# Patient Record
Sex: FEMALE | Race: WHITE | Employment: UNEMPLOYED | URBAN - METROPOLITAN AREA
[De-identification: names, ages, dates, MRNs, and addresses within clinical notes are randomized per-mention and may not be internally consistent; named-entity substitution may affect disease eponyms.]

---

## 2020-10-06 ENCOUNTER — APPOINTMENT (OUTPATIENT)
Dept: GENERAL RADIOLOGY | Age: 35
DRG: 439 | End: 2020-10-06
Attending: INTERNAL MEDICINE
Payer: OTHER GOVERNMENT

## 2020-10-06 ENCOUNTER — APPOINTMENT (OUTPATIENT)
Dept: CT IMAGING | Age: 35
DRG: 439 | End: 2020-10-06
Attending: EMERGENCY MEDICINE
Payer: OTHER GOVERNMENT

## 2020-10-06 ENCOUNTER — HOSPITAL ENCOUNTER (INPATIENT)
Age: 35
LOS: 4 days | Discharge: LEFT BEFORE DISCHARGE | DRG: 439 | End: 2020-10-10
Attending: EMERGENCY MEDICINE | Admitting: INTERNAL MEDICINE
Payer: OTHER GOVERNMENT

## 2020-10-06 ENCOUNTER — APPOINTMENT (OUTPATIENT)
Dept: ULTRASOUND IMAGING | Age: 35
DRG: 439 | End: 2020-10-06
Attending: EMERGENCY MEDICINE
Payer: OTHER GOVERNMENT

## 2020-10-06 DIAGNOSIS — K85.20 ALCOHOL-INDUCED ACUTE PANCREATITIS, UNSPECIFIED COMPLICATION STATUS: Primary | ICD-10-CM

## 2020-10-06 DIAGNOSIS — E87.6 HYPOKALEMIA: ICD-10-CM

## 2020-10-06 DIAGNOSIS — D69.6 THROMBOCYTOPENIA (HCC): ICD-10-CM

## 2020-10-06 DIAGNOSIS — D64.9 ANEMIA, UNSPECIFIED TYPE: ICD-10-CM

## 2020-10-06 PROBLEM — K85.90 ACUTE PANCREATITIS: Status: ACTIVE | Noted: 2020-10-06

## 2020-10-06 LAB
ALBUMIN SERPL-MCNC: 1.8 G/DL (ref 3.4–5)
ALBUMIN/GLOB SERPL: 0.2 {RATIO} (ref 0.8–1.7)
ALP SERPL-CCNC: 332 U/L (ref 45–117)
ALT SERPL-CCNC: 36 U/L (ref 13–56)
AMMONIA PLAS-SCNC: <10 UMOL/L (ref 11–32)
ANION GAP SERPL CALC-SCNC: 13 MMOL/L (ref 3–18)
AST SERPL-CCNC: 212 U/L (ref 10–38)
BASOPHILS # BLD: 0 K/UL (ref 0–0.1)
BASOPHILS NFR BLD: 0 % (ref 0–2)
BILIRUB SERPL-MCNC: 14.9 MG/DL (ref 0.2–1)
BUN SERPL-MCNC: 3 MG/DL (ref 7–18)
BUN/CREAT SERPL: 4 (ref 12–20)
CALCIUM SERPL-MCNC: 7.9 MG/DL (ref 8.5–10.1)
CHLORIDE SERPL-SCNC: 97 MMOL/L (ref 100–111)
CO2 SERPL-SCNC: 24 MMOL/L (ref 21–32)
CREAT SERPL-MCNC: 0.81 MG/DL (ref 0.6–1.3)
DIFFERENTIAL METHOD BLD: ABNORMAL
EOSINOPHIL # BLD: 0 K/UL (ref 0–0.4)
EOSINOPHIL NFR BLD: 0 % (ref 0–5)
ERYTHROCYTE [DISTWIDTH] IN BLOOD BY AUTOMATED COUNT: 19.8 % (ref 11.6–14.5)
GLOBULIN SER CALC-MCNC: 8.6 G/DL (ref 2–4)
GLUCOSE SERPL-MCNC: 102 MG/DL (ref 74–99)
HCG SERPL QL: NEGATIVE
HCT VFR BLD AUTO: 24.2 % (ref 35–45)
HGB BLD-MCNC: 8.7 G/DL (ref 12–16)
INR PPP: 2 (ref 0.8–1.2)
LIPASE SERPL-CCNC: 895 U/L (ref 73–393)
LYMPHOCYTES # BLD: 0.6 K/UL (ref 0.9–3.6)
LYMPHOCYTES NFR BLD: 6 % (ref 21–52)
MCH RBC QN AUTO: 34.5 PG (ref 24–34)
MCHC RBC AUTO-ENTMCNC: 36 G/DL (ref 31–37)
MCV RBC AUTO: 96 FL (ref 74–97)
MONOCYTES # BLD: 0.8 K/UL (ref 0.05–1.2)
MONOCYTES NFR BLD: 8 % (ref 3–10)
NEUTS SEG # BLD: 8.5 K/UL (ref 1.8–8)
NEUTS SEG NFR BLD: 86 % (ref 40–73)
PLATELET # BLD AUTO: 65 K/UL (ref 135–420)
POTASSIUM SERPL-SCNC: 2.8 MMOL/L (ref 3.5–5.5)
PROT SERPL-MCNC: 10.4 G/DL (ref 6.4–8.2)
PROTHROMBIN TIME: 22.5 SEC (ref 11.5–15.2)
RBC # BLD AUTO: 2.52 M/UL (ref 4.2–5.3)
SODIUM SERPL-SCNC: 134 MMOL/L (ref 136–145)
WBC # BLD AUTO: 10 K/UL (ref 4.6–13.2)

## 2020-10-06 PROCEDURE — 82140 ASSAY OF AMMONIA: CPT

## 2020-10-06 PROCEDURE — 74011000636 HC RX REV CODE- 636: Performed by: EMERGENCY MEDICINE

## 2020-10-06 PROCEDURE — 84703 CHORIONIC GONADOTROPIN ASSAY: CPT

## 2020-10-06 PROCEDURE — 96374 THER/PROPH/DIAG INJ IV PUSH: CPT

## 2020-10-06 PROCEDURE — 74011250637 HC RX REV CODE- 250/637: Performed by: EMERGENCY MEDICINE

## 2020-10-06 PROCEDURE — 74011250636 HC RX REV CODE- 250/636: Performed by: EMERGENCY MEDICINE

## 2020-10-06 PROCEDURE — 83690 ASSAY OF LIPASE: CPT

## 2020-10-06 PROCEDURE — 96375 TX/PRO/DX INJ NEW DRUG ADDON: CPT

## 2020-10-06 PROCEDURE — 80307 DRUG TEST PRSMV CHEM ANLYZR: CPT

## 2020-10-06 PROCEDURE — 99285 EMERGENCY DEPT VISIT HI MDM: CPT

## 2020-10-06 PROCEDURE — 74177 CT ABD & PELVIS W/CONTRAST: CPT

## 2020-10-06 PROCEDURE — 85025 COMPLETE CBC W/AUTO DIFF WBC: CPT

## 2020-10-06 PROCEDURE — 65270000029 HC RM PRIVATE

## 2020-10-06 PROCEDURE — 85610 PROTHROMBIN TIME: CPT

## 2020-10-06 PROCEDURE — 80053 COMPREHEN METABOLIC PANEL: CPT

## 2020-10-06 PROCEDURE — 76705 ECHO EXAM OF ABDOMEN: CPT

## 2020-10-06 PROCEDURE — 81001 URINALYSIS AUTO W/SCOPE: CPT

## 2020-10-06 PROCEDURE — 93005 ELECTROCARDIOGRAM TRACING: CPT

## 2020-10-06 PROCEDURE — 71045 X-RAY EXAM CHEST 1 VIEW: CPT

## 2020-10-06 RX ORDER — HYDROMORPHONE HYDROCHLORIDE 1 MG/ML
1 INJECTION, SOLUTION INTRAMUSCULAR; INTRAVENOUS; SUBCUTANEOUS
Status: COMPLETED | OUTPATIENT
Start: 2020-10-06 | End: 2020-10-06

## 2020-10-06 RX ORDER — ONDANSETRON 2 MG/ML
8 INJECTION INTRAMUSCULAR; INTRAVENOUS
Status: COMPLETED | OUTPATIENT
Start: 2020-10-06 | End: 2020-10-06

## 2020-10-06 RX ORDER — POTASSIUM CHLORIDE 7.45 MG/ML
10 INJECTION INTRAVENOUS
Status: DISCONTINUED | OUTPATIENT
Start: 2020-10-06 | End: 2020-10-06

## 2020-10-06 RX ORDER — CITALOPRAM 20 MG/1
TABLET, FILM COATED ORAL DAILY
COMMUNITY

## 2020-10-06 RX ORDER — MELOXICAM 7.5 MG/1
TABLET ORAL DAILY
COMMUNITY

## 2020-10-06 RX ADMIN — SODIUM CHLORIDE, SODIUM LACTATE, POTASSIUM CHLORIDE, AND CALCIUM CHLORIDE 1000 ML: 600; 310; 30; 20 INJECTION, SOLUTION INTRAVENOUS at 20:08

## 2020-10-06 RX ADMIN — HYDROMORPHONE HYDROCHLORIDE 1 MG: 1 INJECTION, SOLUTION INTRAMUSCULAR; INTRAVENOUS; SUBCUTANEOUS at 20:09

## 2020-10-06 RX ADMIN — IOPAMIDOL 94 ML: 612 INJECTION, SOLUTION INTRAVENOUS at 21:12

## 2020-10-06 RX ADMIN — POTASSIUM BICARBONATE 40 MEQ: 782 TABLET, EFFERVESCENT ORAL at 22:34

## 2020-10-06 RX ADMIN — ONDANSETRON 8 MG: 2 INJECTION INTRAMUSCULAR; INTRAVENOUS at 20:09

## 2020-10-07 ENCOUNTER — APPOINTMENT (OUTPATIENT)
Dept: MRI IMAGING | Age: 35
DRG: 439 | End: 2020-10-07
Attending: INTERNAL MEDICINE
Payer: OTHER GOVERNMENT

## 2020-10-07 PROBLEM — R17 JAUNDICE: Status: ACTIVE | Noted: 2020-10-07

## 2020-10-07 LAB
ALBUMIN SERPL-MCNC: 1.5 G/DL (ref 3.4–5)
ALBUMIN/GLOB SERPL: 0.2 {RATIO} (ref 0.8–1.7)
ALP SERPL-CCNC: 254 U/L (ref 45–117)
ALT SERPL-CCNC: 30 U/L (ref 13–56)
ANION GAP SERPL CALC-SCNC: 8 MMOL/L (ref 3–18)
APPEARANCE UR: ABNORMAL
AST SERPL-CCNC: 165 U/L (ref 10–38)
ATRIAL RATE: 100 BPM
BACTERIA URNS QL MICRO: ABNORMAL /HPF
BASOPHILS # BLD: 0 K/UL (ref 0–0.1)
BASOPHILS NFR BLD: 0 % (ref 0–2)
BILIRUB SERPL-MCNC: 13.2 MG/DL (ref 0.2–1)
BILIRUB UR QL: ABNORMAL
BUN SERPL-MCNC: 3 MG/DL (ref 7–18)
BUN/CREAT SERPL: 5 (ref 12–20)
CALCIUM SERPL-MCNC: 7.6 MG/DL (ref 8.5–10.1)
CALCULATED P AXIS, ECG09: 58 DEGREES
CALCULATED R AXIS, ECG10: 11 DEGREES
CALCULATED T AXIS, ECG11: 39 DEGREES
CHLORIDE SERPL-SCNC: 100 MMOL/L (ref 100–111)
CO2 SERPL-SCNC: 28 MMOL/L (ref 21–32)
COLOR UR: ABNORMAL
COVID-19 RAPID TEST, COVR: NOT DETECTED
CREAT SERPL-MCNC: 0.61 MG/DL (ref 0.6–1.3)
DIAGNOSIS, 93000: NORMAL
DIFFERENTIAL METHOD BLD: ABNORMAL
EOSINOPHIL # BLD: 0 K/UL (ref 0–0.4)
EOSINOPHIL NFR BLD: 0 % (ref 0–5)
EPITH CASTS URNS QL MICRO: ABNORMAL /LPF (ref 0–5)
ERYTHROCYTE [DISTWIDTH] IN BLOOD BY AUTOMATED COUNT: 20.1 % (ref 11.6–14.5)
ETHANOL SERPL-MCNC: 130 MG/DL (ref 0–3)
GLOBULIN SER CALC-MCNC: 7 G/DL (ref 2–4)
GLUCOSE SERPL-MCNC: 68 MG/DL (ref 74–99)
GLUCOSE UR STRIP.AUTO-MCNC: NEGATIVE MG/DL
HCT VFR BLD AUTO: 20.4 % (ref 35–45)
HEALTH STATUS, XMCV2T: NORMAL
HGB BLD-MCNC: 7.2 G/DL (ref 12–16)
HGB UR QL STRIP: NEGATIVE
KETONES UR QL STRIP.AUTO: NEGATIVE MG/DL
LEUKOCYTE ESTERASE UR QL STRIP.AUTO: ABNORMAL
LYMPHOCYTES # BLD: 0.9 K/UL (ref 0.9–3.6)
LYMPHOCYTES NFR BLD: 11 % (ref 21–52)
MCH RBC QN AUTO: 33.6 PG (ref 24–34)
MCHC RBC AUTO-ENTMCNC: 35.3 G/DL (ref 31–37)
MCV RBC AUTO: 95.3 FL (ref 74–97)
MONOCYTES # BLD: 0.7 K/UL (ref 0.05–1.2)
MONOCYTES NFR BLD: 9 % (ref 3–10)
NEUTS SEG # BLD: 6.6 K/UL (ref 1.8–8)
NEUTS SEG NFR BLD: 80 % (ref 40–73)
NITRITE UR QL STRIP.AUTO: NEGATIVE
P-R INTERVAL, ECG05: 158 MS
PH UR STRIP: 6.5 [PH] (ref 5–8)
PLATELET # BLD AUTO: 48 K/UL (ref 135–420)
POTASSIUM SERPL-SCNC: 3.2 MMOL/L (ref 3.5–5.5)
PROT SERPL-MCNC: 8.5 G/DL (ref 6.4–8.2)
PROT UR STRIP-MCNC: ABNORMAL MG/DL
Q-T INTERVAL, ECG07: 374 MS
QRS DURATION, ECG06: 98 MS
QTC CALCULATION (BEZET), ECG08: 482 MS
RBC # BLD AUTO: 2.14 M/UL (ref 4.2–5.3)
RBC #/AREA URNS HPF: ABNORMAL /HPF (ref 0–5)
RBC MORPH BLD: ABNORMAL
SODIUM SERPL-SCNC: 136 MMOL/L (ref 136–145)
SOURCE, COVRS: NORMAL
SP GR UR REFRACTOMETRY: >1.03 (ref 1–1.03)
SPECIMEN TYPE, XMCV1T: NORMAL
UROBILINOGEN UR QL STRIP.AUTO: 1 EU/DL (ref 0.2–1)
VENTRICULAR RATE, ECG03: 100 BPM
WBC # BLD AUTO: 8.2 K/UL (ref 4.6–13.2)
WBC URNS QL MICRO: ABNORMAL /HPF (ref 0–4)

## 2020-10-07 PROCEDURE — 80053 COMPREHEN METABOLIC PANEL: CPT

## 2020-10-07 PROCEDURE — 2709999900 HC NON-CHARGEABLE SUPPLY

## 2020-10-07 PROCEDURE — 74183 MRI ABD W/O CNTR FLWD CNTR: CPT

## 2020-10-07 PROCEDURE — 74011250636 HC RX REV CODE- 250/636: Performed by: INTERNAL MEDICINE

## 2020-10-07 PROCEDURE — 83036 HEMOGLOBIN GLYCOSYLATED A1C: CPT

## 2020-10-07 PROCEDURE — 74011250636 HC RX REV CODE- 250/636: Performed by: EMERGENCY MEDICINE

## 2020-10-07 PROCEDURE — 87635 SARS-COV-2 COVID-19 AMP PRB: CPT

## 2020-10-07 PROCEDURE — 36415 COLL VENOUS BLD VENIPUNCTURE: CPT

## 2020-10-07 PROCEDURE — 74011636320 HC RX REV CODE- 636/320: Performed by: HOSPITALIST

## 2020-10-07 PROCEDURE — A9575 INJ GADOTERATE MEGLUMI 0.1ML: HCPCS | Performed by: HOSPITALIST

## 2020-10-07 PROCEDURE — 65270000029 HC RM PRIVATE

## 2020-10-07 PROCEDURE — 74011000258 HC RX REV CODE- 258: Performed by: INTERNAL MEDICINE

## 2020-10-07 PROCEDURE — 85025 COMPLETE CBC W/AUTO DIFF WBC: CPT

## 2020-10-07 PROCEDURE — 74011250637 HC RX REV CODE- 250/637: Performed by: INTERNAL MEDICINE

## 2020-10-07 PROCEDURE — 74011000258 HC RX REV CODE- 258: Performed by: EMERGENCY MEDICINE

## 2020-10-07 RX ORDER — LANOLIN ALCOHOL/MO/W.PET/CERES
12 CREAM (GRAM) TOPICAL
Status: DISCONTINUED | OUTPATIENT
Start: 2020-10-07 | End: 2020-10-10 | Stop reason: HOSPADM

## 2020-10-07 RX ORDER — LORAZEPAM 2 MG/ML
3 INJECTION INTRAMUSCULAR
Status: DISCONTINUED | OUTPATIENT
Start: 2020-10-07 | End: 2020-10-10 | Stop reason: HOSPADM

## 2020-10-07 RX ORDER — NALOXONE HYDROCHLORIDE 0.4 MG/ML
0.4 INJECTION, SOLUTION INTRAMUSCULAR; INTRAVENOUS; SUBCUTANEOUS AS NEEDED
Status: DISCONTINUED | OUTPATIENT
Start: 2020-10-07 | End: 2020-10-10 | Stop reason: HOSPADM

## 2020-10-07 RX ORDER — CITALOPRAM 20 MG/1
20 TABLET, FILM COATED ORAL DAILY
Status: DISCONTINUED | OUTPATIENT
Start: 2020-10-07 | End: 2020-10-10 | Stop reason: HOSPADM

## 2020-10-07 RX ORDER — PROCHLORPERAZINE EDISYLATE 5 MG/ML
5 INJECTION INTRAMUSCULAR; INTRAVENOUS
Status: DISCONTINUED | OUTPATIENT
Start: 2020-10-07 | End: 2020-10-10 | Stop reason: HOSPADM

## 2020-10-07 RX ORDER — LORAZEPAM 2 MG/ML
2 INJECTION INTRAMUSCULAR
Status: DISCONTINUED | OUTPATIENT
Start: 2020-10-07 | End: 2020-10-10 | Stop reason: HOSPADM

## 2020-10-07 RX ORDER — SODIUM CHLORIDE 0.9 % (FLUSH) 0.9 %
5-40 SYRINGE (ML) INJECTION AS NEEDED
Status: DISCONTINUED | OUTPATIENT
Start: 2020-10-07 | End: 2020-10-10 | Stop reason: HOSPADM

## 2020-10-07 RX ORDER — DOCUSATE SODIUM 100 MG/1
100 CAPSULE, LIQUID FILLED ORAL
Status: DISCONTINUED | OUTPATIENT
Start: 2020-10-07 | End: 2020-10-10 | Stop reason: HOSPADM

## 2020-10-07 RX ORDER — MORPHINE SULFATE 2 MG/ML
2-4 INJECTION, SOLUTION INTRAMUSCULAR; INTRAVENOUS
Status: DISCONTINUED | OUTPATIENT
Start: 2020-10-07 | End: 2020-10-09

## 2020-10-07 RX ORDER — LORAZEPAM 1 MG/1
1 TABLET ORAL
Status: DISCONTINUED | OUTPATIENT
Start: 2020-10-07 | End: 2020-10-10 | Stop reason: HOSPADM

## 2020-10-07 RX ORDER — LORAZEPAM 1 MG/1
2 TABLET ORAL
Status: DISCONTINUED | OUTPATIENT
Start: 2020-10-07 | End: 2020-10-10 | Stop reason: HOSPADM

## 2020-10-07 RX ORDER — ONDANSETRON 2 MG/ML
8 INJECTION INTRAMUSCULAR; INTRAVENOUS
Status: COMPLETED | OUTPATIENT
Start: 2020-10-07 | End: 2020-10-07

## 2020-10-07 RX ORDER — PANTOPRAZOLE SODIUM 40 MG/10ML
40 INJECTION, POWDER, LYOPHILIZED, FOR SOLUTION INTRAVENOUS DAILY PRN
Status: DISCONTINUED | OUTPATIENT
Start: 2020-10-07 | End: 2020-10-10 | Stop reason: HOSPADM

## 2020-10-07 RX ORDER — SODIUM CHLORIDE 9 MG/ML
100 INJECTION, SOLUTION INTRAVENOUS CONTINUOUS
Status: DISCONTINUED | OUTPATIENT
Start: 2020-10-07 | End: 2020-10-08

## 2020-10-07 RX ORDER — ONDANSETRON 2 MG/ML
4 INJECTION INTRAMUSCULAR; INTRAVENOUS
Status: DISCONTINUED | OUTPATIENT
Start: 2020-10-07 | End: 2020-10-10 | Stop reason: HOSPADM

## 2020-10-07 RX ORDER — SODIUM CHLORIDE, SODIUM LACTATE, POTASSIUM CHLORIDE, CALCIUM CHLORIDE 600; 310; 30; 20 MG/100ML; MG/100ML; MG/100ML; MG/100ML
150 INJECTION, SOLUTION INTRAVENOUS CONTINUOUS
Status: DISCONTINUED | OUTPATIENT
Start: 2020-10-07 | End: 2020-10-07

## 2020-10-07 RX ORDER — HYDROMORPHONE HYDROCHLORIDE 1 MG/ML
1 INJECTION, SOLUTION INTRAMUSCULAR; INTRAVENOUS; SUBCUTANEOUS ONCE
Status: COMPLETED | OUTPATIENT
Start: 2020-10-07 | End: 2020-10-07

## 2020-10-07 RX ORDER — SODIUM CHLORIDE 0.9 % (FLUSH) 0.9 %
5-40 SYRINGE (ML) INJECTION EVERY 8 HOURS
Status: DISCONTINUED | OUTPATIENT
Start: 2020-10-07 | End: 2020-10-10 | Stop reason: HOSPADM

## 2020-10-07 RX ORDER — IPRATROPIUM BROMIDE AND ALBUTEROL SULFATE 2.5; .5 MG/3ML; MG/3ML
3 SOLUTION RESPIRATORY (INHALATION)
Status: DISCONTINUED | OUTPATIENT
Start: 2020-10-07 | End: 2020-10-10 | Stop reason: HOSPADM

## 2020-10-07 RX ORDER — LORAZEPAM 2 MG/ML
1 INJECTION INTRAMUSCULAR
Status: DISCONTINUED | OUTPATIENT
Start: 2020-10-07 | End: 2020-10-10 | Stop reason: HOSPADM

## 2020-10-07 RX ADMIN — MORPHINE SULFATE 4 MG: 2 INJECTION, SOLUTION INTRAMUSCULAR; INTRAVENOUS at 08:35

## 2020-10-07 RX ADMIN — SODIUM CHLORIDE 100 ML/HR: 900 INJECTION, SOLUTION INTRAVENOUS at 12:30

## 2020-10-07 RX ADMIN — MORPHINE SULFATE 4 MG: 2 INJECTION, SOLUTION INTRAMUSCULAR; INTRAVENOUS at 14:29

## 2020-10-07 RX ADMIN — GADOTERATE MEGLUMINE 15 ML: 376.9 INJECTION INTRAVENOUS at 13:35

## 2020-10-07 RX ADMIN — MORPHINE SULFATE 2 MG: 2 INJECTION, SOLUTION INTRAMUSCULAR; INTRAVENOUS at 21:06

## 2020-10-07 RX ADMIN — PIPERACILLIN AND TAZOBACTAM 3.38 G: 3; .375 INJECTION, POWDER, LYOPHILIZED, FOR SOLUTION INTRAVENOUS at 00:33

## 2020-10-07 RX ADMIN — ONDANSETRON 8 MG: 2 INJECTION INTRAMUSCULAR; INTRAVENOUS at 01:45

## 2020-10-07 RX ADMIN — PIPERACILLIN AND TAZOBACTAM 3.38 G: 3; .375 INJECTION, POWDER, LYOPHILIZED, FOR SOLUTION INTRAVENOUS at 21:00

## 2020-10-07 RX ADMIN — Medication 10 ML: at 22:09

## 2020-10-07 RX ADMIN — Medication 10 ML: at 06:09

## 2020-10-07 RX ADMIN — CITALOPRAM HYDROBROMIDE 20 MG: 20 TABLET ORAL at 08:35

## 2020-10-07 RX ADMIN — PIPERACILLIN AND TAZOBACTAM 3.38 G: 3; .375 INJECTION, POWDER, LYOPHILIZED, FOR SOLUTION INTRAVENOUS at 14:26

## 2020-10-07 RX ADMIN — PROCHLORPERAZINE EDISYLATE 5 MG: 5 INJECTION INTRAMUSCULAR; INTRAVENOUS at 12:30

## 2020-10-07 RX ADMIN — ONDANSETRON 4 MG: 2 INJECTION INTRAMUSCULAR; INTRAVENOUS at 08:35

## 2020-10-07 RX ADMIN — HYDROMORPHONE HYDROCHLORIDE 1 MG: 1 INJECTION, SOLUTION INTRAMUSCULAR; INTRAVENOUS; SUBCUTANEOUS at 01:45

## 2020-10-07 NOTE — PROGRESS NOTES
Problem: Discharge Planning  Goal: *Discharge to safe environment  Outcome: Resolved/Met      Home    Reason for Admission:   Acute Pancreatitis / Jaundice                   RUR Score: 10                    Plan for utilizing home health:  Not indicated        PCP:First and Last name:  Dane Robles   Name of Practice:    Are you a current patient: Yes/No:  Yes   Approximate date of last visit: Last month   Can you participate in a virtual visit with your PCP: Yes                    Current Advanced Directive/Advance Care Plan:  None, does not want to complete one @ this time. Transition of Care Plan:  Home with out-pt follow up when medically stable. Chart reviewed. Met with pt., verified all demographics. States has  ins. NOK: Sam Monsivais, spouse, with whom she lives with & will pick her up @ discharge. Uses no DME. Independent with ADL's prior to admit. Will cont to follow for any needs. Keesha RomeoRN,ext [id number]. Patient has designated her spouse to participate in his/her discharge plan and to receive any needed information. Name: Sam Monsivais  Address:  Phone number:  [phone]    Care Management Interventions  PCP Verified by CM: Yes  Palliative Care Criteria Met (RRAT>21 & CHF Dx)?: No  Mode of Transport at Discharge:  Other (see comment)(family)  Transition of Care Consult (CM Consult): Discharge Planning  Discharge Durable Medical Equipment: No  Physical Therapy Consult: No  Occupational Therapy Consult: No  Speech Therapy Consult: No  Current Support Network: Lives with Spouse  Confirm Follow Up Transport: Self  Discharge Location  Discharge Placement: Home

## 2020-10-07 NOTE — ED NOTES
TRANSFER - ED to INPATIENT REPORT:    Verbal report given on Sandie Lomeli  being transferred to 2400(unit) for routine progression of care       Report consisted of patients Situation, Background, Assessment and   Recommendations(SBAR). SBAR report made available to receiving floor on this patient being transferred to 2400  for routine progression of care       Admitting diagnosis Acute pancreatitis [K85.90]  Thrombocytopenia ([address] [D69.6]  Hypokalemia [E87.6]    Information from the following report(s) SBAR, ED Summary, MAR and Recent Results was made available to receiving floor. Lines:   Peripheral IV 10/06/20 Right Antecubital (Active)        HCG Status for ALL Females under 55 y/o: NO     Medication list confirmed with patient    Opportunity for questions and clarification was provided. Patient is oriented to time, place, person and situation .   Patient is  continent and ambulatory without assist     Valuables transported with patient     Patient transported with:   Monitor  Registered Nurse    MAP (Monitor): 85 =Monitored (most recent)  Vitals w/ MEWS Score (last day)     Date/Time MEWS Score Pulse Resp Temp BP Level of Consciousness SpO2    10/07/20 0015          114/76    99 %    10/07/20 0001          115/75    97 %    10/06/20 2315          120/77    97 %    10/06/20 2308              100 %    10/06/20 2307          127/79        10/06/20 2245          117/74    96 %    10/06/20 2230          117/73    97 %    10/06/20 2215          117/73    97 %    10/06/20 2200          120/78    98 %    10/06/20 2145          118/75    98 %    10/06/20 2015          120/79    98 %    10/06/20 2013              100 %    10/06/20 2006              100 %    10/06/20 1714  2  (!) 102  16  97.9 °F (36.6 °C)  129/83  Alert  100 %

## 2020-10-07 NOTE — ROUTINE PROCESS
0021 TRANSFER - IN REPORT: 
 
Verbal report received from [address] [place] [address] Street, RN(name) on Olinda Wiley  being received from ED(unit) for routine progression of care Report consisted of patients Situation, Background, Assessment and  
Recommendations(SBAR). Information from the following report(s) SBAR, ED Summary, [facility] and Recent Results was reviewed with the receiving nurse. Opportunity for questions and clarification was provided. Assessment completed upon patients arrival to unit and care assumed. 0210 pt ambulated from stretcher to bed. Pt is alert, no distress noted. Call light is within reach, bedside table in reach. Bed in low position with wheels locked. IV in place, no fluids running. Skin intact. Pt experiencing nausea after ambulating, no emesis. 0214 VS taken, pain assessed. 0240 ambulated with pt to bathroom to void. Gait steady. Decreased nausea at this time. 2670 VS taken, pain assessed. No complaints of nausea at this time. 0740 Bedside and Verbal shift change report given to Kacie Rosas RN (oncoming nurse) by Mendy Medina RN (offgoing nurse). Report included the following information SBAR, Kardex, Intake/Output, and MAR.

## 2020-10-07 NOTE — PROGRESS NOTES
Patient received in bed awake. Patient A&Ox4, denies pain and discomfort. No distress noted. Frequently use items within reach. Bed locked in low position. Call bell within reach and Patient verbalized understanding of use for assistance and needs. 0830-  Patient c/o nausea; see MAR for prn Compazine 5mg IV to be given. Call bell w/in reach. 6629- Patient given Morphine 4 mg IV for abd pain 8/10. See MAR and pain assessments. 0900- Patient awake; denies having nausea. Call bell w/in reach. 1429- Patient given Morphine 4 mg IV for abd pain 8/10. See MAR and pain assessments. 1830- Patient awake. Reassessment completed, no change in patient condition. Call bell w/in reach.

## 2020-10-07 NOTE — ED PROVIDER NOTES
EMERGENCY DEPARTMENT HISTORY AND PHYSICAL EXAM      Date: 10/6/2020  Patient Name: Tita Rosen    History of Presenting Illness     Chief Complaint   Patient presents with    Abdominal Pain    Vomiting       History (Context): Tita Rosen is a 38 y.o. woman with alcoholic liver disease history of pancreatitis, who has been jaundiced for the past several months, who presents with acute onset, persistent, severe epigastric abdominal pain associated with nausea and vomiting. The pain radiates to the back. Exacerbated with eating. On review of systems, the patient denies fever, chills, diarrhea, back pain, chest pain, shortness of breath, diaphoresis, rashes, tick bite, recent travel. PCP: ZOEY Lee    Current Facility-Administered Medications   Medication Dose Route Frequency Provider Last Rate Last Dose    piperacillin-tazobactam (ZOSYN) 3.375 g in 0.9% sodium chloride (MBP/ADV) 100 mL MBP  3.375 g IntraVENous NOW Saurabh Frye MD         Current Outpatient Medications   Medication Sig Dispense Refill    citalopram (CELEXA) 20 mg tablet Take  by mouth daily.  meloxicam (MOBIC) 7.5 mg tablet Take  by mouth daily. Past History     Past Medical History:  Past Medical History:   Diagnosis Date    Arthritis     Asthma        Past Surgical History:  Past Surgical History:   Procedure Laterality Date    HX ORTHOPAEDIC      right hip       Family History:  History reviewed. No pertinent family history. Social History:  Social History     Tobacco Use    Smoking status: Current Every Day Smoker    Smokeless tobacco: Never Used   Substance Use Topics    Alcohol use: Yes     Comment: yesterday last drink    Drug use: Not on file       Allergies: Allergies   Allergen Reactions    Shellfish Derived Hives         Review of Systems   As per HPI, otherwise reviewed and negative.      Physical Exam     Vitals:    10/06/20 2308 10/06/20 2315 10/07/20 0001 10/07/20 0015   BP: 120/77 115/75 114/76   Pulse:       Resp:       Temp:       SpO2: 100% 97% 97% 99%   Weight:       Height:           Gen: In clear pain, chronically ill-appearing  HEENT: Normocephalic, sclera icteric, sublingual icterus  Cardiovascular: Normal rate, regular rhythm, no murmurs, rubs, gallops. Pulses intact and equal distally. Pulmonary: No respiratory distress. No stridor. Clear lungs. ABD: Soft, tender in the epigastrium, positive Robles sign, nondistended. Neuro: Alert. Normal speech. Normal mentation. Psych: Normal thought content and thought processes. : No CVA tenderness  EXT: Moves all extremities well. No cyanosis or clubbing. Skin: Icteric. Warm and well-perfused. Diagnostic Study Results     Labs -     Recent Results (from the past 12 hour(s))   CBC WITH AUTOMATED DIFF    Collection Time: 10/06/20  6:07 PM   Result Value Ref Range    WBC 10.0 4.6 - 13.2 K/uL    RBC 2.52 (L) 4.20 - 5.30 M/uL    HGB 8.7 (L) 12.0 - 16.0 g/dL    HCT 24.2 (L) 35.0 - 45.0 %    MCV 96.0 74.0 - 97.0 FL    MCH 34.5 (H) 24.0 - 34.0 PG    MCHC 36.0 31.0 - 37.0 g/dL    RDW 19.8 (H) 11.6 - 14.5 %    PLATELET 65 (L) 594 - 420 K/uL    NEUTROPHILS 86 (H) 40 - 73 %    LYMPHOCYTES 6 (L) 21 - 52 %    MONOCYTES 8 3 - 10 %    EOSINOPHILS 0 0 - 5 %    BASOPHILS 0 0 - 2 %    ABS. NEUTROPHILS 8.5 (H) 1.8 - 8.0 K/UL    ABS. LYMPHOCYTES 0.6 (L) 0.9 - 3.6 K/UL    ABS. MONOCYTES 0.8 0.05 - 1.2 K/UL    ABS. EOSINOPHILS 0.0 0.0 - 0.4 K/UL    ABS.  BASOPHILS 0.0 0.0 - 0.1 K/UL    DF AUTOMATED     METABOLIC PANEL, COMPREHENSIVE    Collection Time: 10/06/20  6:07 PM   Result Value Ref Range    Sodium 134 (L) 136 - 145 mmol/L    Potassium 2.8 (LL) 3.5 - 5.5 mmol/L    Chloride 97 (L) 100 - 111 mmol/L    CO2 24 21 - 32 mmol/L    Anion gap 13 3.0 - 18 mmol/L    Glucose 102 (H) 74 - 99 mg/dL    BUN 3 (L) 7.0 - 18 MG/DL    Creatinine 0.81 0.6 - 1.3 MG/DL    BUN/Creatinine ratio 4 (L) 12 - 20      GFR est AA >60 >60 ml/min/1.73m2    GFR est non-AA >60 >60 ml/min/1.73m2    Calcium 7.9 (L) 8.5 - 10.1 MG/DL    Bilirubin, total 14.9 (H) 0.2 - 1.0 MG/DL    ALT (SGPT) 36 13 - 56 U/L    AST (SGOT) 212 (H) 10 - 38 U/L    Alk. phosphatase 332 (H) 45 - 117 U/L    Protein, total 10.4 (H) 6.4 - 8.2 g/dL    Albumin 1.8 (L) 3.4 - 5.0 g/dL    Globulin 8.6 (H) 2.0 - 4.0 g/dL    A-G Ratio 0.2 (L) 0.8 - 1.7     LIPASE    Collection Time: 10/06/20  6:07 PM   Result Value Ref Range    Lipase 895 (H) 73 - 393 U/L   HCG QL SERUM    Collection Time: 10/06/20  6:07 PM   Result Value Ref Range    HCG, Ql. Negative NEG     PROTHROMBIN TIME + INR    Collection Time: 10/06/20  6:07 PM   Result Value Ref Range    Prothrombin time 22.5 (H) 11.5 - 15.2 sec    INR 2.0 (H) 0.8 - 1.2     ETHYL ALCOHOL    Collection Time: 10/06/20  6:07 PM   Result Value Ref Range    ALCOHOL(ETHYL),SERUM 130 (H) 0 - 3 MG/DL   AMMONIA    Collection Time: 10/06/20  8:10 PM   Result Value Ref Range    Ammonia <10 (L) 11 - 32 UMOL/L   EKG, 12 LEAD, INITIAL    Collection Time: 10/06/20  9:43 PM   Result Value Ref Range    Ventricular Rate 100 BPM    Atrial Rate 100 BPM    P-R Interval 158 ms    QRS Duration 98 ms    Q-T Interval 374 ms    QTC Calculation (Bezet) 482 ms    Calculated P Axis 58 degrees    Calculated R Axis 11 degrees    Calculated T Axis 39 degrees    Diagnosis       Normal sinus rhythm  Possible Left atrial enlargement  Cannot rule out Anterior infarct , age undetermined  Abnormal ECG  No previous ECGs available         Radiologic Studies -   CT ABD PELV W CONT    (Results Pending)   US ABD LTD    (Results Pending)   XR CHEST PORT    (Results Pending)     CT Results  (Last 48 hours)    None        CXR Results  (Last 48 hours)    None            Medical Decision Making   I am the first provider for this patient. I reviewed the vital signs, available nursing notes, past medical history, past surgical history, family history and social history.     Vital Signs-Reviewed the patient's vital signs.    EKG: Interpreted by myself. Records Reviewed: By myself personally on initial evaluation    MDM:   Patient presents with abdominal pain. Exam significant for tenderness in the epigastrium. DDX considered: Pancreatitis, gastritis, peptic ulcer disease, cholecystitis, choledocholithiasis, SBO, functional abdominal pain, acute intermittent porphyria, gastroparesis, gastroenteritis. DDX thought to be less likely but also considered due to high risk condition: Cholangitis, mesenteric ischemia. Plan:   Pain Control  Antiemetics  Close Observation    Orders as below:  Orders Placed This Encounter    CT ABD PELV W CONT    US ABD LTD    XR CHEST PORT    CBC WITH AUTOMATED DIFF    COMPREHENSIVE METABOLIC PANEL    LIPASE    AMMONIA    HCG QL SERUM    URINALYSIS W/ RFLX MICROSCOPIC    PROTHROMBIN TIME + INR    ETHYL ALCOHOL    IP CONSULT TO GASTROENTEROLOGY    EKG, 12 LEAD, INITIAL    citalopram (CELEXA) 20 mg tablet    meloxicam (MOBIC) 7.5 mg tablet    ondansetron (ZOFRAN) injection 8 mg    lactated ringers bolus infusion 1,000 mL    HYDROmorphone (DILAUDID) injection 1 mg    potassium bicarb-citric acid (EFFER-K) tablet 40 mEq    iopamidoL (ISOVUE 300) 61 % contrast injection 100 mL    piperacillin-tazobactam (ZOSYN) 3.375 g in 0.9% sodium chloride (MBP/ADV) 100 mL MBP    DISCONTD: potassium chloride 10 mEq in 100 ml IVPB    IP CONSULT TO HOSPITALIST    INITIAL PHYSICIAN ORDER: INPATIENT Medical; 3. Patient receiving treatment that can only be provided in an inpatient setting (further clarification in H&P documentation)        ED Course:   ED Course as of Oct 07 0024   Tue Oct 06, 2020   1949 Will replete   Potassium(!!): 2.8 [DT]   0878 Spoke to the hospitalist.  He recommends I consult GI for formation to admit, given cystic lesions in the pancreas. [DT]   8544 Spoke to gastroenterology, and they agree with admission here for the patient.     [DT]      ED Course User Index  [DT] Geovanny Zamorano MD     Ultrasound demonstrated findings of acalculous cholecystitis, likely secondary to ascites and pancreatitis. This determination was made in speaking with the radiologist.    Disposition:  Admit    Critical Care Time:       Diagnosis     Clinical Impression:   1. Alcohol-induced acute pancreatitis, unspecified complication status    2. Hypokalemia    3. Thrombocytopenia ([address]    4. Anemia, unspecified type        Signed,  Rosita Watson MD  Emergency Physician  [facility]    As a voice dictation software was utilized to dictate this note, minor word transpositions can occur. I apologize for confusing wording and typographic errors. Please feel free to contact me for clarification.

## 2020-10-07 NOTE — ROUTINE PROCESS
Bedside and Verbal shift change report given to Emily Acevedo RN (oncoming nurse) by Nunu Tee RN, BSN (offgoing nurse). Report given with SBAR, Kardex, Intake/Output, MAR and Recent Results.

## 2020-10-07 NOTE — CONSULTS
Gastroenterology Consult    Patient: Robert Lindsay MRN: [id number]  SSN: [id number]    YOB: 1985  Age: 38 y.o. Sex: female        Assessment:   1. Acute alcohol induced pancreatitis superimposed on chronic pancreatitis with CT evidence of small pseudocyst formation in the distal and proximal pancreas. 2.  Abnormal US and CT of gallbladder suggesting acute cholecystitis. 3.  Cirrhosis of the liver with superimposed acute alcohol induced hepatitis and chronic hyperbilirubinemia due to hepatocellular dysfunction and less likely due to biliary obstruction. Discriminant function 56. MELD 24.  4.  Abnormal CT of colon suggesting mural thickening of the cecum and proximal ascending colon. No symptoms of colitis such as diarrhea or rectal bleeding. Suspect due to severe hypoalbuminemia and portal hypertension. 5.  Chronic macrocytic anemia likely due to chronic alcoholism and malnutrition. 6.  Chronic alcohol dependency. 7.  Hx of iron overload (elevated ferritin) with negative HFE mutation analysis likely due to chronic alcoholic liver disease. Plan:   1.   Schedule MRI and MRCP to assess for CBD stone. 2.   Bowel rest and  mL/hour. 3.   Judicious pain management and minimize narcotic analgesics. 4.   Suggest surgical consultation to assess for possible acute cholecystitis. Agree with Zosyn IV. 5.   No therapeutic plans for her small and incidental pseudocysts and will need outpatient surveillance c/o Dr. Julian Ann. 6.  She will need and EGD and Colonoscopy once her acute pancreatitis/cholecystitis have resolved. 7.   Discussed with patient the dangers of continued alcohol dependency and she will need outpatient substance abuse counselling and treatment. 8.   Avoid steroids in the setting of possible acute cholecystitis. Chief Complaint:   Chief Complaint   Patient presents with    Abdominal Pain    Vomiting       Subjective:      Robert Lindsay is a 38 y.o. female with a known history of alcohol induced cirrhosis, alcoholic hepatitis, chronic hyperbilirubinemia and thrombocytopenia, and alcohol induced pancreatitis was admitted via ED for several day of intractable vomiting and mainly RUQ abdominal pain and tenderness radiating to her back. She was recently admitted to [facility] a month ago for acute pancreatitis and was treated supportively. It was there that the diagnosis of cirrhosis was made based on CT morphology, jaundice, abnormal LFTs and thrombocytopenia. Her viral hepatitis serology was negative. She had an elevated ferritin (> 1000) but hemochromatosis genetics showed a single C282Y mutation. She has chronic alcohol dependency and has been through the [facility] for detoxification. She is unable to stop drinking vodka at about 1/2 bottle per day. She has two young children at home and  is with the [employer]. She denies hematemesis, diarrhea, rectal bleeding or melena. No fever, chills, dizziness or lightheadness. At the ED she was not hypotensive. Blood work showed K 2.8,  TB 14.9, , ALT 36, , Alb 1.8, Crea 0.81. Lipase 895. Ammonia <10. Urine preg test negative. Protime 22.5, INR 2.0. Serum alcohol elevated at 130. Hgb 8.7, Hct 24.2, Plt 65,000, Wbc 10,000, MCV 96.   US reported hepatomegaly with steatosis and borderline GB wall thickening and pericholecystic edema concerning for acute cholecystitis. CTAP with contrast reported mild inflammation around the pancreatic head and tail. Borderline PD dilatation in the body. Hypodensities measuring 1.5-1.7 cm in the proximal and distal pancreas suggestive of pseudocysts. There was minimal ascites and GB noted to be mildly distended with GB wall thickening and pericholecystic fluid concerning for acute cholecystitis. Finally there was mild mural thickening of the cecum andf proximal ascending colon.     Hospital Problems  Date Reviewed: 10/6/2020          Codes Class Noted POA    Acute pancreatitis ICD-10-CM: K85.90  ICD-9-CM: 024.5  10/6/2020 Unknown        Hypokalemia ICD-10-CM: E87.6  ICD-9-CM: 276.8  10/6/2020 Unknown        Thrombocytopenia (HCC) ICD-10-CM: D69.6  ICD-9-CM: 287.5  10/6/2020 Unknown            Past Medical History:   Diagnosis Date    Arthritis     Asthma      Past Surgical History:   Procedure Laterality Date    HX ORTHOPAEDIC      right hip      History reviewed. No pertinent family history. Social History     Tobacco Use    Smoking status: Current Every Day Smoker    Smokeless tobacco: Never Used   Substance Use Topics    Alcohol use: Yes     Comment: yesterday last drink      Current Facility-Administered Medications   Medication Dose Route Frequency Provider Last Rate Last Dose    citalopram (CELEXA) tablet 20 mg  20 mg Oral DAILY Joce Ruiz,         sodium chloride (NS) flush 5-40 mL  5-40 mL IntraVENous Q8H Joce Ruiz, DO   10 mL at 10/07/20 0609    sodium chloride (NS) flush 5-40 mL  5-40 mL IntraVENous PRN Suzanne Macias, DO        ondansetron (ZOFRAN) injection 4 mg  4 mg IntraVENous Q6H PRN Joce Ruiz,         docusate sodium (COLACE) capsule 100 mg  100 mg Oral BID PRN Joce Ruiz, DO        melatonin tablet 12 mg  12 mg Oral QHS PRN Joce Ruiz,         albuterol-ipratropium (DUO-NEB) 2.5 MG-0.5 MG/3 ML  3 mL Nebulization Q4H PRN Joce Ruiz,         pantoprazole (PROTONIX) injection 40 mg  40 mg IntraVENous DAILY PRN Suzanne Macias, DO        morphine injection 2-4 mg  2-4 mg IntraVENous Q3H PRN Joce Ruiz, DO        naloxone (NARCAN) injection 0.4 mg  0.4 mg IntraVENous PRN Suzanne Macias, DO            Allergies   Allergen Reactions    Shellfish Derived Hives       Review of Systems:  A comprehensive review of systems was negative except for that written in the History of Present Illness.     Objective:     Patient Vitals for the past 24 hrs:   Temp Pulse Resp BP SpO2   10/07/20 0608 98 °F (36.7 °C) 99 16 117/70 95 %   10/07/20 0214 97.7 °F (36.5 °C) 100 16 121/74 98 %   10/07/20 0015    114/76 99 %   10/07/20 0001    115/75 97 %   10/06/20 2315    120/77 97 %   10/06/20 2308     100 %   10/06/20 2307    127/79    10/06/20 2245    117/74 96 %   10/06/20 2230    117/73 97 %   10/06/20 2215    117/73 97 %   10/06/20 2200    120/78 98 %   10/06/20 2145    118/75 98 %   10/06/20 2015    120/79 98 %   10/06/20 2013     100 %   10/06/20 2006     100 %   10/06/20 1714 97.9 °F (36.6 °C) (!) 102 16 129/83 100 %         Intake/Output Summary (Last 24 hours) at 10/7/2020 0731  Last data filed at 10/6/2020 2038  Gross per 24 hour   Intake 1000 ml   Output    Net 1000 ml       Physical Exam:  Well developed, jaundiced, awake, coherent and oriented. Icteric sclerae. No oral thrush. Supple neck. No JVD. Several spider angiomas. Clear breath sounds. RRR, no murmurs. Abdomen protruberant and soft. Normal BS.  RUQ tenderness w/o Robles's sign. No rebound or guarding. No mass or hepatosplenomegaly. No fluid wave. No hernias. Rectal exam deferred. Warm extremities,  2+ pulses, no edema, no asterexis. Laboratory Results:    Labs: Results:   Chemistry Recent Labs     10/07/20  0625 10/06/20  1807   GLU 68* 102*    134*   K 3.2* 2.8*    97*   CO2 28 24   BUN 3* 3*   CREA 0.61 0.81   CA 7.6* 7.9*   AGAP 8 13   BUCR 5* 4*   * 332*   TP 8.5* 10.4*   ALB 1.5* 1.8*   GLOB 7.0* 8.6*   AGRAT 0.2* 0.2*    Estimated Creatinine Clearance: 117.3 mL/min (by C-G formula based on SCr of 0.61 mg/dL). CBC w/Diff Recent Labs     10/06/20  1807   WBC 10.0   RBC 2.52*   HGB 8.7*   HCT 24.2*   PLT 65*   GRANS 86*   LYMPH 6*   EOS 0      Cardiac Enzymes No results for input(s): CPK, CKND1, BERNADETTE in the last 72 hours.     No lab exists for component: CKRMB, TROIP   Coagulation Recent Labs     10/06/20  1807   PTP 22.5*   INR 2.0*       Hepatitis Panel No results found for: HAMAT, HAAB, HABT, HAAT, HBSAG, HBSB, HBSAT, HBABN, HBCM, HBCAB, HBCAT, XBCABS, HBEAB, HBEAG, XHEPCS, [id number], HBEGLT, HBCMLT, HBCLT, HBEBLT, [id number], [id number], HAVMLT, [id number], HBCMLT, [id number], HCGAT   Amylase Lipase    Liver Enzymes Recent Labs     10/07/20  0625   TP 8.5*   ALB 1.5*   *   ALT 30      Thyroid Studies No results for input(s): T4, T3U, TSH, TSHEXT in the last 72 hours. No lab exists for component: T3RU     Pathology pathology         Signed By: Puja Rodriguez.  Siddhartha Peterson MD, FACP    October 7, 2020

## 2020-10-07 NOTE — PROGRESS NOTES
conducted an initial consultation and Spiritual Assessment for Sandie Lomeli, who is a 38 y.o.,female. Patients Primary Language is: [place]. According to the patients EMR Mosque Affiliation is: Congregational. The reason the Patient came to the hospital is:   Patient Active Problem List    Diagnosis Date Noted    Acute pancreatitis 10/06/2020    Hypokalemia 10/06/2020    Thrombocytopenia ([address] 10/06/2020        The  provided the following Interventions:  Initiated a relationship of care and support. Explored issues of jose, spirituality and/or Mosque needs while hospitalized. Listened empathically. Provided chaplaincy education. Provided information about Spiritual Care Services. Offered prayer and assurance of continued prayers on patient's behalf. Chart reviewed. The following outcomes were achieved:  Patient shared some information about their medical narrative and spiritual journey/beliefs. Patient processed feeling about current hospitalization. Patient expressed gratitude for the 's visit. Assessment:  Patient did not indicate any spiritual or Mosque issues which require Spiritual Care Services interventions at this time. Patient does not have any Mosque/cultural needs that will affect patients preferences in health care. Plan:  Chaplains will continue to follow and will provide pastoral care on an as needed or requested basis.  recommends bedside caregivers page  on duty if patient shows signs of acute spiritual or emotional distress.     [facility]   Staff [address] [place] [address]   [phone]

## 2020-10-07 NOTE — H&P
History and Physical    Patient: Alonso Maria MRN: [id number]  SSN: [id number]    YOB: 1985  Age: 38 y.o. Sex: female      Subjective:      Alonso Maria is a 38 y.o. female who presents to [facility] ER with complaint of Abdominal Pain and Vomiting. Patient states that this is the first time that she has had jaundice and the third time that she has had Acute Pancreatitis (previously diagnosed as Alcohol-induced Pancreatitis). Patient reports that she has been experiencing abdominal pain, distention, and bilious vomit for the last two days. Patient reports that her last drink was on Monday and consisted of [address] with ~4-5 shots of Vodka. Patient states that she normally consumes 0.75-1.5 L of Liquor/week and has had Alcohol Withdrawal that consistent of shakes, sweats, and vomiting (but denies hallucinations, seizures, or agitation). Notably, CT Abdomen/Pelvis showed evidence of Ascites, Cirrhosis, and Portal Hypertension on 8/11/2020. In [facility] ER, Patient is noted to have Heart Rate 102 bpm, Hgb 8.7, Platelet 64A, INR 2.0, Ammonia <10, Na+ 134, K+ 2.8, Cl- 97, Total Bilirubin 14.9, Total Protein 10.4, Albumin 1.8, , Alk Phos 332, Lipase 895, Urinalysis positive for UTI, and HCG (-).  CT Abdomen/Pelvis showed Pancreatitis with possible infectious/inflammatory fluids collections around head and tail of pancreas. CT also showed possibly secondary inflammation of Gallbladder. Gastroenterological services were contacted by [facility] ER Physician to evaluate for the possible need for Interventional Radiological services needing to drain these fluids collections and it was deemed unlikely. Patient is admitted to [facility] Remote Telemetry Unit (?) (Non-Covid-19 Cohort) for management of Acute (Alcoholic?) Pancreatitis with with questionable Alcoholic Hepatitis versus Common Bile Duct Obstruction/Narrowing.     Past Medical History:   Diagnosis Date    Acute alcoholic pancreatitis     x2  Alcohol withdrawal ([facility]     H/O ETOH Withdrawal    Arthritis     Asthma     Cirrhosis, alcoholic ([facility]     Depression     ETOH abuse     History of ascites     HLD (hyperlipidemia)     Portal hypertension (HCC)     Tobacco abuse      Past Surgical History:   Procedure Laterality Date    HX ORTHOPAEDIC      right hip      Family History   Problem Relation Age of Onset    Diabetes Mother     Cancer Mother     Coronary Artery Disease Father      Social History     Tobacco Use    Smoking status: Current Some Day Smoker     Packs/day: 0.15     Years: 21.00     Pack years: 3.15    Smokeless tobacco: Never Used    Tobacco comment: (started age 12) Currently 1 pack/week   Substance Use Topics    Alcohol use: Yes     Alcohol/week: 16.0 - 50.0 standard drinks     Types: 16 - 50 Shots of liquor per week     Comment: last drink 10/05/2020; 0.75 - 1.5 L of Liquor/week      Prior to Admission medications    Medication Sig Start Date End Date Taking? Authorizing Provider   citalopram (CELEXA) 20 mg tablet Take  by mouth daily. Yes Other, MD Feli   meloxicam (MOBIC) 7.5 mg tablet Take  by mouth daily.    Yes Other, MD Feli        Allergies   Allergen Reactions    Shellfish Derived Hives       Review of Systems:  (+) Abdominal Pain  (+) Nausea  (+) Vomiting  (+) Jaundice  (+) Joint Pain/Swelling  (-) Fevers  (-) Chills  (-) Cough  (-) Increased Sputum Production  (-) Chest Pain  (-) Diarrhea  (-) Stool Abnormality  (-) Dysuria  All other systems have been reviewed and are negative        Objective:     Vitals:    10/07/20 0015 10/07/20 0214 10/07/20 0608 10/07/20 0811   BP: 114/76 121/74 117/70 117/68   Pulse:  100 99 98   Resp:  16 16 17   Temp:  97.7 °F (36.5 °C) 98 °F (36.7 °C) 97.8 °F (36.6 °C)   SpO2: 99% 98% 95% 95%   Weight:       Height:            Physical Exam:  General:  Adult female lying in bed in no acute distress  HEENT:  Atraumatic, normocephalic; Pupils equally round and reactive to light with accommodation; (+) Moderate Bilateral Scleral Icterus; Extraocular muscles intact; Moist Oropharynx without erythema, edema, or exudates; (+) Mild to Moderate Jaundice of Posterior Oropharynx; (+) Sublingual Jaundice  Neck:  No Bruits; No Lymphadenopathy  Chest:  No pectus carinatum; No pectus excavatum  Cardiovascular:  Regular rate and rhythm without rubs, gallops, or murmurs  Respiratory:  Clear to Auscultation Bilaterally without wheezes, rales, or rhonchi; normal effort of breathing  Abdominal:  Soft, non-tense, (+) Questionably Distended; (+) Mild to Moderate Epigastric Pain; (+) Reduced BS present without guarding, rebound, or masses  :  Deferred  Extremities:  Pulses 2+ x4 without edema, clubbing, or cyanosis  Musculoskeletal:  Strength 5/5 and symmetrical in BUE and BLE  Integument:  No rash on face, forearms, or legs; (+) Moderate Diffuse Jaundice  Neurological:  A&O x4/4; No gross deficits of Visual Acuity, Eye Movement, Jaw Opening, Facial Expression, Hearing, Phonation, or Head Movement; No gross deficits of Tongue Movement or Slurring of Speech  Psychiatric:  Affect is appropriate; Language is present and fluent; Behavior is appropriate      I have personally reviewed the CXR and have found, per my read, some pulmonary congestion with no other obvious or significant radiological findings. I have personally reviewed the CXR and have found, per my read, some R'R activity with no obvious or significant findings.     Assessment:     Hospital Problems  Date Reviewed: 10/6/2020          Codes Class Noted POA    Acute pancreatitis ICD-10-CM: K85.90  ICD-9-CM: 868.7  10/6/2020 Yes        Jaundice ICD-10-CM: R17  ICD-9-CM: 782.4  10/7/2020 Yes        * (Principal) Hypokalemia ICD-10-CM: E87.6  ICD-9-CM: 276.8  10/6/2020 Yes        Thrombocytopenia ([address] ICD-10-CM: D69.6  ICD-9-CM: 287.5  10/6/2020 Yes              Plan:     IV Zosyn, IV fluids ( mL/hr), NPO (except medications and ice), CIWA Protocol, and repeat Lipase in AM.  Consult Gastroenterological services to evaluate for possible need for MRCP or Steroids for possible Alcoholic Hepatitis. Continue home medications for Depression. DVT mechanoprophylaxis (Patient's INR 2.0 with Platelets 96G).     Signed By: Kayden Encinas DO     October 7, 2020

## 2020-10-08 PROBLEM — K70.30 ALCOHOLIC CIRRHOSIS (HCC): Status: ACTIVE | Noted: 2020-10-07

## 2020-10-08 PROBLEM — F10.10 ETOH ABUSE: Status: ACTIVE | Noted: 2020-10-08

## 2020-10-08 PROBLEM — K85.20 ALCOHOL-INDUCED ACUTE PANCREATITIS: Status: ACTIVE | Noted: 2020-10-06

## 2020-10-08 LAB
ANION GAP SERPL CALC-SCNC: 10 MMOL/L (ref 3–18)
BUN SERPL-MCNC: 6 MG/DL (ref 7–18)
BUN/CREAT SERPL: 9 (ref 12–20)
CALCIUM SERPL-MCNC: 7.3 MG/DL (ref 8.5–10.1)
CHLORIDE SERPL-SCNC: 99 MMOL/L (ref 100–111)
CO2 SERPL-SCNC: 26 MMOL/L (ref 21–32)
CREAT SERPL-MCNC: 0.67 MG/DL (ref 0.6–1.3)
EST. AVERAGE GLUCOSE BLD GHB EST-MCNC: ABNORMAL MG/DL
GLUCOSE BLD STRIP.AUTO-MCNC: 56 MG/DL (ref 70–110)
GLUCOSE BLD STRIP.AUTO-MCNC: 65 MG/DL (ref 70–110)
GLUCOSE BLD STRIP.AUTO-MCNC: 90 MG/DL (ref 70–110)
GLUCOSE SERPL-MCNC: 46 MG/DL (ref 74–99)
HBA1C MFR BLD: <3.8 % (ref 4.2–5.6)
LIPASE SERPL-CCNC: 652 U/L (ref 73–393)
POTASSIUM SERPL-SCNC: 3.4 MMOL/L (ref 3.5–5.5)
SODIUM SERPL-SCNC: 135 MMOL/L (ref 136–145)

## 2020-10-08 PROCEDURE — 36415 COLL VENOUS BLD VENIPUNCTURE: CPT

## 2020-10-08 PROCEDURE — 80048 BASIC METABOLIC PNL TOTAL CA: CPT

## 2020-10-08 PROCEDURE — 74011000258 HC RX REV CODE- 258: Performed by: INTERNAL MEDICINE

## 2020-10-08 PROCEDURE — 74011250637 HC RX REV CODE- 250/637: Performed by: INTERNAL MEDICINE

## 2020-10-08 PROCEDURE — 65270000029 HC RM PRIVATE

## 2020-10-08 PROCEDURE — 74011250636 HC RX REV CODE- 250/636: Performed by: PHYSICIAN ASSISTANT

## 2020-10-08 PROCEDURE — 83690 ASSAY OF LIPASE: CPT

## 2020-10-08 PROCEDURE — 82962 GLUCOSE BLOOD TEST: CPT

## 2020-10-08 PROCEDURE — 74011250636 HC RX REV CODE- 250/636: Performed by: INTERNAL MEDICINE

## 2020-10-08 PROCEDURE — 74011250637 HC RX REV CODE- 250/637: Performed by: HOSPITALIST

## 2020-10-08 RX ORDER — DEXTROSE MONOHYDRATE 25 G/50ML
25-50 INJECTION, SOLUTION INTRAVENOUS AS NEEDED
Status: DISCONTINUED | OUTPATIENT
Start: 2020-10-08 | End: 2020-10-10 | Stop reason: HOSPADM

## 2020-10-08 RX ORDER — DEXTROSE, SODIUM CHLORIDE, AND POTASSIUM CHLORIDE 5; .9; .15 G/100ML; G/100ML; G/100ML
50 INJECTION INTRAVENOUS CONTINUOUS
Status: DISCONTINUED | OUTPATIENT
Start: 2020-10-08 | End: 2020-10-10 | Stop reason: HOSPADM

## 2020-10-08 RX ORDER — MAGNESIUM SULFATE 100 %
4 CRYSTALS MISCELLANEOUS AS NEEDED
Status: DISCONTINUED | OUTPATIENT
Start: 2020-10-08 | End: 2020-10-10 | Stop reason: HOSPADM

## 2020-10-08 RX ORDER — MAGNESIUM SULFATE 100 %
CRYSTALS MISCELLANEOUS
Status: DISPENSED
Start: 2020-10-08 | End: 2020-10-08

## 2020-10-08 RX ORDER — MORPHINE SULFATE 4 MG/ML
INJECTION, SOLUTION INTRAMUSCULAR; INTRAVENOUS
Status: DISCONTINUED
Start: 2020-10-08 | End: 2020-10-08 | Stop reason: WASHOUT

## 2020-10-08 RX ADMIN — Medication 16 G: at 11:42

## 2020-10-08 RX ADMIN — CITALOPRAM HYDROBROMIDE 20 MG: 20 TABLET ORAL at 09:11

## 2020-10-08 RX ADMIN — Medication 10 ML: at 14:53

## 2020-10-08 RX ADMIN — PIPERACILLIN AND TAZOBACTAM 3.38 G: 3; .375 INJECTION, POWDER, LYOPHILIZED, FOR SOLUTION INTRAVENOUS at 02:55

## 2020-10-08 RX ADMIN — MORPHINE SULFATE 2 MG: 2 INJECTION, SOLUTION INTRAMUSCULAR; INTRAVENOUS at 09:11

## 2020-10-08 RX ADMIN — MORPHINE SULFATE 2 MG: 2 INJECTION, SOLUTION INTRAMUSCULAR; INTRAVENOUS at 19:58

## 2020-10-08 RX ADMIN — PIPERACILLIN AND TAZOBACTAM 3.38 G: 3; .375 INJECTION, POWDER, LYOPHILIZED, FOR SOLUTION INTRAVENOUS at 11:05

## 2020-10-08 RX ADMIN — Medication 10 ML: at 23:19

## 2020-10-08 RX ADMIN — MORPHINE SULFATE 2 MG: 2 INJECTION, SOLUTION INTRAMUSCULAR; INTRAVENOUS at 14:52

## 2020-10-08 RX ADMIN — MORPHINE SULFATE 2 MG: 2 INJECTION, SOLUTION INTRAMUSCULAR; INTRAVENOUS at 23:19

## 2020-10-08 RX ADMIN — DEXTROSE MONOHYDRATE, SODIUM CHLORIDE, AND POTASSIUM CHLORIDE 100 ML/HR: 50; 9; 1.49 INJECTION, SOLUTION INTRAVENOUS at 15:45

## 2020-10-08 RX ADMIN — Medication 12 MG: at 01:09

## 2020-10-08 RX ADMIN — MORPHINE SULFATE 4 MG: 2 INJECTION, SOLUTION INTRAMUSCULAR; INTRAVENOUS at 01:09

## 2020-10-08 RX ADMIN — Medication 10 ML: at 06:00

## 2020-10-08 NOTE — PROGRESS NOTES
Papo Tyler is a 38 y.o. female with a known history of alcohol induced cirrhosis, alcoholic hepatitis, chronic hyperbilirubinemia and thrombocytopenia, and alcohol induced pancreatitis was admitted via ED for several day of intractable vomiting and mainly RUQ abdominal pain and tenderness radiating to her back. She was recently admitted to [place] [address] a month ago for acute pancreatitis and was treated supportively. It was there that the diagnosis of cirrhosis was made based on CT morphology, jaundice, abnormal LFTs and thrombocytopenia. Her viral hepatitis serology was negative. She had an elevated ferritin (> 1000) but hemochromatosis genetics showed a single C282Y mutation. She has chronic alcohol dependency and has been through the [facility] for detoxification. She is unable to stop drinking vodka at about 1/2 bottle per day. Feels better today, no nausea or vomiting today, decreased abdominal pain. T Bili down to 13.2, Lipase down to 652, Hgb=7.2    Pt alert and orientd, no asterexis, abd- soft ,non tender    MRI 10/7/2020: 1. Findings of acute on chronic pancreatitis with pseudocysts in the head and  tail. Mass effect from the pancreatic head pseudocyst causes mild narrowing of  the common bile duct. No evidence of acute cholecystitis. 2.  Small volume ascites present. 3.  Hepatomegaly and diffuse steatosis.  Borderline splenomegaly    Imp: Cirrhosis           Pancreatitis with pseudo cysts           Anemia, coagulopathy, thrombocytopenia    Plan: Can try on clear liquids           Monitor labs and abd exam

## 2020-10-08 NOTE — PROGRESS NOTES
Internal Medicine Progress Note    Patient's Name: Bassem Nix  Admit Date: 10/6/2020  Length of Stay: 2      Assessment/Plan     Principal Problem:    Alcohol-induced acute pancreatitis (10/6/2020)    Active Problems:    Alcoholic cirrhosis ([facility] (10/7/2020)      Thrombocytopenia ([facility] (10/6/2020)      Hypokalemia (10/6/2020)      ETOH abuse (10/8/2020)      Pancreatitis with pseudocysts  - clears per GI  - appreciate GI assistance  - PRN pain control  - monitor lipase    Alcoholic cirrhosis  - monitor LFTs, INR    Thrombocytopenia  - monitor  - 2/2 cirrhosis    Etoh abuse  - CIWA protocol    Hypokalemia  - replace, monitor      - Cont acceptable home medications for chronic conditions   - DVT protocol    I have personally reviewed all pertinent labs and films that have officially resulted over the last 24 hours. I have personally checked for all pending labs that are awaiting final results. Anticipated discharge: >2 days    HPI     Bassem Nix is a 38 y.o. female who presents to [facility] ER with complaint of Abdominal Pain and Vomiting. Patient states that this is the first time that she has had jaundice and the third time that she has had Acute Pancreatitis (previously diagnosed as Alcohol-induced Pancreatitis). Patient reports that she has been experiencing abdominal pain, distention, and bilious vomit for the last two days. Patient reports that her last drink was on Monday and consisted of [address] with ~4-5 shots of Vodka. Patient states that she normally consumes 0.75-1.5 L of Liquor/week and has had Alcohol Withdrawal that consistent of shakes, sweats, and vomiting (but denies hallucinations, seizures, or agitation).   Notably, CT Abdomen/Pelvis showed evidence of Ascites, Cirrhosis, and Portal Hypertension on 8/11/2020.     In [facility] ER, Patient is noted to have Heart Rate 102 bpm, Hgb 8.7, Platelet 13I, INR 2.0, Ammonia <10, Na+ 134, K+ 2.8, Cl- 97, Total Bilirubin 14.9, Total Protein 10.4, Albumin 1.8, , Alk Phos 332, Lipase 895, and HCG (-).  CT Abdomen/Pelvis showed Pancreatitis with possible infectious/inflammatory fluids collections around head and tail of pancreas. CT also showed possibly secondary inflammation of Gallbladder. Gastroenterological services were contacted by [facility] ER Physician to evaluate for the possible need for Interventional Radiological services needing to drain these fluids collections and it was deemed unlikely. Hospital Course     GI was consulted. \"She was recently admitted to [facility] a month ago for acute pancreatitis and was treated supportively. Quintendre Roque was there that the diagnosis of cirrhosis was made based on CT morphology, jaundice, abnormal LFTs and thrombocytopenia.  Her viral hepatitis serology was negative. \"    Subjective     Pt s/e @ bedside. No major events overnight. Pt states abd pain has improved. Objective     Visit Vitals  /61   Pulse 95   Temp 97.2 °F (36.2 °C)   Resp 18   Ht 5' 7\" (1.702 m)   Wt 74.8 kg (165 lb)   SpO2 98%   BMI 25.84 kg/m²       Physical Exam:  General Appearance: NAD, conversant  HENT: normocephalic/atraumatic, moist mucus membranes  Neck: No JVD, supple  Lungs: CTA with normal respiratory effort  CV: RRR, no m/r/g  Abdomen: soft, non-tender, normal bowel sounds  Extremities: no cyanosis, no peripheral edema  Neuro: No focal deficits, motor/sensory intact  Skin: Normal color, intact      Intake and Output:  Current Shift:  No intake/output data recorded.   Last three shifts:  10/06 1901 - 10/08 0700  In: 2530 [P.O.:480; I.V.:2050]  Out: 200 [Urine:200]    Lab/Data Reviewed:  BMP:   Lab Results   Component Value Date/Time     (L) 10/08/2020 04:00 AM    K 3.4 (L) 10/08/2020 04:00 AM    CL 99 (L) 10/08/2020 04:00 AM    CO2 26 10/08/2020 04:00 AM    AGAP 10 10/08/2020 04:00 AM    GLU 46 (LL) 10/08/2020 04:00 AM    BUN 6 (L) 10/08/2020 04:00 AM    CREA 0.67 10/08/2020 04:00 AM    GFRAA >60 10/08/2020 04:00 AM    GFRNA >60 10/08/2020 04:00 AM     CBC: No results found for: WBC, HGB, HGBEXT, HCT, HCTEXT, PLT, PLTEXT, HGBEXT, HCTEXT, PLTEXT    Imaging Reviewed:  No results found.     Medications Reviewed:  Current Facility-Administered Medications   Medication Dose Route Frequency    glucose chewable tablet 16 g  4 Tab Oral PRN    glucagon (GLUCAGEN) injection 1 mg  1 mg IntraMUSCular PRN    dextrose (D50) infusion 12.5-25 g  25-50 mL IntraVENous PRN    glucose 4 gram chewable tablet        dextrose 5% - 0.9% NaCl with KCl 20 mEq/L infusion  100 mL/hr IntraVENous CONTINUOUS    citalopram (CELEXA) tablet 20 mg  20 mg Oral DAILY    sodium chloride (NS) flush 5-40 mL  5-40 mL IntraVENous Q8H    sodium chloride (NS) flush 5-40 mL  5-40 mL IntraVENous PRN    ondansetron (ZOFRAN) injection 4 mg  4 mg IntraVENous Q6H PRN    docusate sodium (COLACE) capsule 100 mg  100 mg Oral BID PRN    melatonin tablet 12 mg  12 mg Oral QHS PRN    albuterol-ipratropium (DUO-NEB) 2.5 MG-0.5 MG/3 ML  3 mL Nebulization Q4H PRN    pantoprazole (PROTONIX) injection 40 mg  40 mg IntraVENous DAILY PRN    morphine injection 2-4 mg  2-4 mg IntraVENous Q3H PRN    naloxone (NARCAN) injection 0.4 mg  0.4 mg IntraVENous PRN    prochlorperazine (COMPAZINE) injection 5 mg  5 mg IntraVENous Q4H PRN    LORazepam (ATIVAN) tablet 1 mg  1 mg Oral Q1H PRN    Or    LORazepam (ATIVAN) injection 1 mg  1 mg IntraVENous Q1H PRN    LORazepam (ATIVAN) tablet 2 mg  2 mg Oral Q1H PRN    Or    LORazepam (ATIVAN) injection 2 mg  2 mg IntraVENous Q1H PRN    LORazepam (ATIVAN) injection 3 mg  3 mg IntraVENous Q15MIN PRN         Peña Terrell PA-C  [address] [facility]  Hospitalist Division  Office:  [phone]  Pager: [phone]

## 2020-10-08 NOTE — PROGRESS NOTES
Comprehensive Nutrition Assessment    Type and Reason for Visit: Initial    Nutrition Recommendations/Plan:   1. Initiate DIET CLEAR LIQUID per GI  2. Monitor labs, weight, PO intake/tolerance and readiness for diet advancement to low fat  3. Recommend MVI, Folvite and Vit B1 d/t ETOH abuse    Nutrition Assessment:  Admitted for acute pancreatitis with N/V/ABD x 2 days PTA. Pt seen in room this morning resting. Reports tolerating sips of clear liquids. Discussed typical diet transition per MD from CL to low fat diet as tolerated. Malnutrition Assessment:  Malnutrition Status: At risk for malnutrition (specify)(ETOH abuse and PO intake)      Nutrition History and Allergies: Shellfish allergy. No problems chewing/swallowing. Normally consumes 1 meal per day with snacks and stable weight. Estimated Daily Nutrient Needs:  Energy (kcal):  9190-1053  Protein (g):         Fluid (ml/day):  1 mL/kcal    Nutrition Related Findings:  Denies any N/V and better controlled ABD pain during visit. Last BM on (10/6). IVF infusing and tolerating sips of clear liquids. Wounds:    None       Current Nutrition Therapies:  DIET CLEAR LIQUID    Anthropometric Measures:  · Height:  5' 7\" (170.2 cm)  · Current Body Wt:  74.8 kg (164 lb 14.5 oz)   · Admission Body Wt:  164 lb 14.5 oz    · Usual Body Wt:  155 lb(per Pt)     · Ideal Body Wt:  135 lbs:  122.2 %   · BMI Category: Overweight (BMI 25.0-29. 9)       Nutrition Diagnosis:   · Inadequate oral intake related to altered GI function as evidenced by NPO or clear liquid status due to medical condition    Nutrition Interventions:   Food and/or Nutrient Delivery: Start oral diet, IV fluid delivery, Mineral supplement, Vitamin supplement  Nutrition Education and Counseling: Counseling initiated  Coordination of Nutrition Care: Continued inpatient monitoring    Goals:  PO nutrition intake will meet >75% of patient estimated nutritional needs within the next 7 days. Nutrition Monitoring and Evaluation:   Behavioral-Environmental Outcomes: Readiness for change  Food/Nutrient Intake Outcomes: Diet advancement/tolerance, Food and nutrient intake, Vitamin/mineral intake, IVF intake  Physical Signs/Symptoms Outcomes: Biochemical data, GI status    Discharge Planning:     Too soon to determine     Electronically signed by Eddie Finnegan on 10/8/2020 at 4:48 PM    Contact:   Alison Kirk, [facility]  Pager: [phone]

## 2020-10-08 NOTE — PROGRESS NOTES
1930  Bedside, Verbal, and Written shift change report given to [address] [place] [address] (oncoming nurse) by Toro Coreas (offgoing nurse). Report included the following information SBAR, Kardex, and MAR.     2000  Patient is in bed, alert and oriented x 4. Room air, IV CDI, dressing CDI, no sign of distress. Bed low, call bell within reach. 2100  Patient is in bed, sleeping. No sign of distress. Bed low, call bell within reach. 2200  Patient is in bed, resting/ sleeping.     2300  Patient is in bed. 0000  Patient is in bed, alert and oriented. Oxygen on/ Room air, IV CDI, dressing CDI, no sign of distress. Bed low, call bell within reach. 0100  Patient called for something to help her sleep and pain medication. 0200  Patient is in bed, resting.    0300  Patient is in bed, resting/ sleeping. No sign of distress. Bed low, call bell within reach. Offered bath, refused. 0400  Patient is in bed, alert and oriented. Oxygen on/ Room air, IV CDI, dressing CDI, no sign of distress. Bed low, call bell within reach. 0730  Bedside, Verbal, and Written shift change report given to [address] [facility] (oncoming nurse) by [address] [place] [address] (offgoing nurse). Report included the following information SBAR, Kardex, and MAR.

## 2020-10-09 PROBLEM — D64.9 ANEMIA: Status: ACTIVE | Noted: 2020-10-09

## 2020-10-09 LAB
ALBUMIN SERPL-MCNC: 1.4 G/DL (ref 3.4–5)
ALBUMIN/GLOB SERPL: 0.2 {RATIO} (ref 0.8–1.7)
ALP SERPL-CCNC: 191 U/L (ref 45–117)
ALT SERPL-CCNC: 30 U/L (ref 13–56)
ANION GAP SERPL CALC-SCNC: 7 MMOL/L (ref 3–18)
AST SERPL-CCNC: 161 U/L (ref 10–38)
BILIRUB DIRECT SERPL-MCNC: 11.6 MG/DL (ref 0–0.2)
BILIRUB SERPL-MCNC: 15.7 MG/DL (ref 0.2–1)
BUN SERPL-MCNC: 7 MG/DL (ref 7–18)
BUN/CREAT SERPL: 10 (ref 12–20)
CALCIUM SERPL-MCNC: 7.2 MG/DL (ref 8.5–10.1)
CHLORIDE SERPL-SCNC: 99 MMOL/L (ref 100–111)
CO2 SERPL-SCNC: 28 MMOL/L (ref 21–32)
CREAT SERPL-MCNC: 0.68 MG/DL (ref 0.6–1.3)
ERYTHROCYTE [DISTWIDTH] IN BLOOD BY AUTOMATED COUNT: 20.4 % (ref 11.6–14.5)
GLOBULIN SER CALC-MCNC: 6.6 G/DL (ref 2–4)
GLUCOSE SERPL-MCNC: 92 MG/DL (ref 74–99)
HCT VFR BLD AUTO: 19.6 % (ref 35–45)
HCT VFR BLD AUTO: 22.8 % (ref 35–45)
HGB BLD-MCNC: 6.7 G/DL (ref 12–16)
HGB BLD-MCNC: 7.8 G/DL (ref 12–16)
HISTORY CHECKED?,CKHIST: NORMAL
INR PPP: 2.3 (ref 0.8–1.2)
LIPASE SERPL-CCNC: 259 U/L (ref 73–393)
MCH RBC QN AUTO: 34 PG (ref 24–34)
MCHC RBC AUTO-ENTMCNC: 34.2 G/DL (ref 31–37)
MCV RBC AUTO: 99.5 FL (ref 74–97)
PLATELET # BLD AUTO: 51 K/UL (ref 135–420)
PMV BLD AUTO: 11.7 FL (ref 9.2–11.8)
POTASSIUM SERPL-SCNC: 3.2 MMOL/L (ref 3.5–5.5)
PROT SERPL-MCNC: 8 G/DL (ref 6.4–8.2)
PROTHROMBIN TIME: 25 SEC (ref 11.5–15.2)
RBC # BLD AUTO: 1.97 M/UL (ref 4.2–5.3)
SODIUM SERPL-SCNC: 134 MMOL/L (ref 136–145)
WBC # BLD AUTO: 6.2 K/UL (ref 4.6–13.2)

## 2020-10-09 PROCEDURE — 85018 HEMOGLOBIN: CPT

## 2020-10-09 PROCEDURE — 80076 HEPATIC FUNCTION PANEL: CPT

## 2020-10-09 PROCEDURE — 74011250637 HC RX REV CODE- 250/637: Performed by: PHYSICIAN ASSISTANT

## 2020-10-09 PROCEDURE — 83690 ASSAY OF LIPASE: CPT

## 2020-10-09 PROCEDURE — 74011250636 HC RX REV CODE- 250/636: Performed by: INTERNAL MEDICINE

## 2020-10-09 PROCEDURE — 36430 TRANSFUSION BLD/BLD COMPNT: CPT

## 2020-10-09 PROCEDURE — 86900 BLOOD TYPING SEROLOGIC ABO: CPT

## 2020-10-09 PROCEDURE — 80048 BASIC METABOLIC PNL TOTAL CA: CPT

## 2020-10-09 PROCEDURE — 65270000029 HC RM PRIVATE

## 2020-10-09 PROCEDURE — P9016 RBC LEUKOCYTES REDUCED: HCPCS

## 2020-10-09 PROCEDURE — 2709999900 HC NON-CHARGEABLE SUPPLY

## 2020-10-09 PROCEDURE — 85027 COMPLETE CBC AUTOMATED: CPT

## 2020-10-09 PROCEDURE — 86923 COMPATIBILITY TEST ELECTRIC: CPT

## 2020-10-09 PROCEDURE — 74011250636 HC RX REV CODE- 250/636: Performed by: PHYSICIAN ASSISTANT

## 2020-10-09 PROCEDURE — 36415 COLL VENOUS BLD VENIPUNCTURE: CPT

## 2020-10-09 PROCEDURE — 85610 PROTHROMBIN TIME: CPT

## 2020-10-09 PROCEDURE — 74011250637 HC RX REV CODE- 250/637: Performed by: INTERNAL MEDICINE

## 2020-10-09 RX ORDER — SODIUM CHLORIDE 9 MG/ML
250 INJECTION, SOLUTION INTRAVENOUS AS NEEDED
Status: DISCONTINUED | OUTPATIENT
Start: 2020-10-09 | End: 2020-10-10 | Stop reason: HOSPADM

## 2020-10-09 RX ORDER — POTASSIUM CHLORIDE 20 MEQ/1
40 TABLET, EXTENDED RELEASE ORAL ONCE
Status: COMPLETED | OUTPATIENT
Start: 2020-10-09 | End: 2020-10-09

## 2020-10-09 RX ORDER — OXYCODONE AND ACETAMINOPHEN 5; 325 MG/1; MG/1
1 TABLET ORAL
Status: DISCONTINUED | OUTPATIENT
Start: 2020-10-10 | End: 2020-10-10 | Stop reason: HOSPADM

## 2020-10-09 RX ADMIN — MORPHINE SULFATE 2 MG: 2 INJECTION, SOLUTION INTRAMUSCULAR; INTRAVENOUS at 15:26

## 2020-10-09 RX ADMIN — Medication 10 ML: at 21:16

## 2020-10-09 RX ADMIN — MORPHINE SULFATE 2 MG: 2 INJECTION, SOLUTION INTRAMUSCULAR; INTRAVENOUS at 08:25

## 2020-10-09 RX ADMIN — POTASSIUM CHLORIDE 40 MEQ: 1500 TABLET, EXTENDED RELEASE ORAL at 08:24

## 2020-10-09 RX ADMIN — CITALOPRAM HYDROBROMIDE 20 MG: 20 TABLET ORAL at 09:00

## 2020-10-09 RX ADMIN — Medication 10 ML: at 06:24

## 2020-10-09 RX ADMIN — ONDANSETRON 4 MG: 2 INJECTION INTRAMUSCULAR; INTRAVENOUS at 08:24

## 2020-10-09 RX ADMIN — MORPHINE SULFATE 2 MG: 2 INJECTION, SOLUTION INTRAMUSCULAR; INTRAVENOUS at 21:12

## 2020-10-09 RX ADMIN — Medication 10 ML: at 15:27

## 2020-10-09 RX ADMIN — DEXTROSE MONOHYDRATE, SODIUM CHLORIDE, AND POTASSIUM CHLORIDE 100 ML/HR: 50; 9; 1.49 INJECTION, SOLUTION INTRAVENOUS at 06:24

## 2020-10-09 NOTE — PROGRESS NOTES
Problem: Falls - Risk of  Goal: *Absence of Falls  Description: Document Nadir Patel Fall Risk and appropriate interventions in the flowsheet.   Outcome: Progressing Towards Goal  Note: Fall Risk Interventions:            Medication Interventions: Patient to call before getting OOB         History of Falls Interventions: Consult care management for discharge planning         Problem: Patient Education: Go to Patient Education Activity  Goal: Patient/Family Education  Outcome: Progressing Towards Goal     Problem: Discharge Planning  Goal: *Discharge to safe environment  Outcome: Progressing Towards Goal  Goal: *Knowledge of medication management  Outcome: Progressing Towards Goal  Goal: *Knowledge of discharge instructions  Outcome: Progressing Towards Goal     Problem: Nutrition Deficit  Goal: *Optimize nutritional status  Outcome: Progressing Towards Goal     Problem: Patient Education: Go to Patient Education Activity  Goal: Patient/Family Education  Outcome: Progressing Towards Goal     Problem: Pain  Goal: *Control of Pain  Outcome: Progressing Towards Goal

## 2020-10-09 NOTE — PROGRESS NOTES
Bedside and Verbal shift change report given to Nelli Menard, RN (oncoming nurse) by Thad Maier RN (offgoing nurse). Report included the following information SBAR, Kardex, Intake/Output, MAR and Recent Results. 0925-Callie from bld bank called RBC ready for pick      1013-bld transfusion started. 1030-Pt is tolerating bld transfusion.     1400-bld transfusion completed, pt tolerated well

## 2020-10-09 NOTE — PROGRESS NOTES
Bedside and Verbal shift change report given to Justus Marquez RN (oncoming nurse) by Sudeep Garcia RN (offgoing nurse). Report included the following information SBAR, Kardex, Intake/Output, MAR and Recent Results. 0911-Patient given Morphine 2mg IV for pain8/10. See MAR and pain assessment ; reassessment and POSS. Call bell within reach. 1122-Galilea from lab call with critical BBS 46 drawn at 0400. Perfect serve  Dr. Jovanni Drew, hypoglycemic protocol ordered. 1126-glucose tab 16g given po pt tolerated well    1129-BS 56    1206-BS 65    1231-BS 90    1452-Patient given Morphine 2 mg IV for pain 7/10. See MAR and pain assessment ; reassessment and POSS. Call bell within reach. 1928-Bedside and Verbal shift change report given to Christina Mckeon (oncoming nurse) by JUAN DIEGO Marquez (offgoing nurse). Report included the following information SBAR, Kardex, Intake/Output, MAR and Recent Results.

## 2020-10-09 NOTE — ROUTINE PROCESS
Bedside and Verbal shift change report given to Lisa Vargas RN (oncoming nurse) by Layne Tyler RN, BSN (offgoing nurse). Report included the following information SBAR, Kardex, ED Summary, Intake/Output, MAR and Recent Results.   
 
Layne Tyler RN, BSN

## 2020-10-09 NOTE — ROUTINE PROCESS
States that there is a ghost in her room playing with her toes. Stated that she told the ghost to stay on his side of the room and she will stay on hers. No noted tremors, sweats, nausea, or vomiting.   
 
0630  Patient asked she can get pain medication and nausea medication. Noted that IV fluid rate was reduced from 100 mL/hour to 5 mL/hour. Secondary nurse stated that patient has been turning off her IV pump alarm all morning. Oncoming nurse notified.  
 
Nallely Moore, RN, BSN

## 2020-10-09 NOTE — PROGRESS NOTES
Problem: Falls - Risk of  Goal: *Absence of Falls  Description: Document Edmonia Morning Fall Risk and appropriate interventions in the flowsheet.   Outcome: Progressing Towards Goal  Note: Fall Risk Interventions:            Medication Interventions: Patient to call before getting OOB, Teach patient to arise slowly         History of Falls Interventions: Door open when patient unattended         Problem: Patient Education: Go to Patient Education Activity  Goal: Patient/Family Education  Outcome: Progressing Towards Goal     Problem: Nutrition Deficit  Goal: *Optimize nutritional status  Outcome: Progressing Towards Goal     Problem: Patient Education: Go to Patient Education Activity  Goal: Patient/Family Education  Outcome: Progressing Towards Goal

## 2020-10-09 NOTE — PROGRESS NOTES
39 yo WF with cirrhosis, pancreatitis with pseudo cysts, anemia, coagulopathy thrombocytopenia. LFTs about the same. Appear alert  And oriented, no asterexis.     Plan: Continue supportive care           ? steroids

## 2020-10-09 NOTE — PROGRESS NOTES
10/9/2020  Chart reviewed. Clear liquids starting. Plan at dc is  Home. Major Rom.  ROBERTO Rodriguez  [facility]  [phone], Pager [phone]  [email or web address]

## 2020-10-09 NOTE — PROGRESS NOTES
Internal Medicine Progress Note    Patient's Name: Gilberto Palma  Admit Date: 10/6/2020  Length of Stay: 3      Assessment/Plan     Principal Problem:    Alcohol-induced acute pancreatitis (10/6/2020)    Active Problems:    Alcoholic cirrhosis ([facility] [address] (10/7/2020)      Thrombocytopenia ([facility] [address] (10/6/2020)      Hypokalemia (10/6/2020)      ETOH abuse (10/8/2020)      Pancreatitis with pseudocysts  - clears per GI  - appreciate GI assistance  - PRN pain control  - monitor lipase - improving    Alcoholic cirrhosis  - monitor LFTs, INR, platelets  - not much change, slight improvement with the exception of Tbili    Anemia  - transfuse 1 U prbcs  - monitor  - no overt bleeding  - GI following    Etoh abuse  - CIWA protocol    Hypokalemia  - replace, monitor      - Cont acceptable home medications for chronic conditions   - DVT protocol    I have personally reviewed all pertinent labs and films that have officially resulted over the last 24 hours. I have personally checked for all pending labs that are awaiting final results. Anticipated discharge: >2 days    HPI     Gilberto Palma is a 38 y.o. female who presents to [facility] ER with complaint of Abdominal Pain and Vomiting. Patient states that this is the first time that she has had jaundice and the third time that she has had Acute Pancreatitis (previously diagnosed as Alcohol-induced Pancreatitis). Patient reports that she has been experiencing abdominal pain, distention, and bilious vomit for the last two days. Patient reports that her last drink was on Monday and consisted of [id number] with ~4-5 shots of Vodka. Patient states that she normally consumes 0.75-1.5 L of Liquor/week and has had Alcohol Withdrawal that consistent of shakes, sweats, and vomiting (but denies hallucinations, seizures, or agitation).   Notably, CT Abdomen/Pelvis showed evidence of Ascites, Cirrhosis, and Portal Hypertension on 8/11/2020.     In [facility] ER, Patient is noted to have Heart Rate 102 bpm, Hgb 8.7, Platelet 68B, INR 2.0, Ammonia <10, Na+ 134, K+ 2.8, Cl- 97, Total Bilirubin 14.9, Total Protein 10.4, Albumin 1.8, , Alk Phos 332, Lipase 895, and HCG (-).  CT Abdomen/Pelvis showed Pancreatitis with possible infectious/inflammatory fluids collections around head and tail of pancreas. CT also showed possibly secondary inflammation of Gallbladder. Gastroenterological services were contacted by [facility] ER Physician to evaluate for the possible need for Interventional Radiological services needing to drain these fluids collections and it was deemed unlikely. Hospital Course     She was started on IV fluids, CIWA protocol. GI was consulted. \"She was recently admitted to [facility] a month ago for acute pancreatitis and was treated supportively. Josesorin Seth was there that the diagnosis of cirrhosis was made based on CT morphology, jaundice, abnormal LFTs and thrombocytopenia.  Her viral hepatitis serology was negative. \" Lipase improved. LFTs trended down. She required 1 U PRBCs on 10/9. Subjective     Pt s/e @ bedside. No major events overnight. Pt reports left sided abd pain. Objective     Visit Vitals  /67 (BP 1 Location: Left arm, BP Patient Position: At rest)   Pulse 95   Temp 98 °F (36.7 °C)   Resp 18   Ht 5' 7\" (1.702 m)   Wt 74.8 kg (165 lb)   SpO2 95%   BMI 25.84 kg/m²       Physical Exam:  General Appearance: NAD, conversant  HENT: normocephalic/atraumatic, moist mucus membranes  Neck: No JVD, supple  Lungs: CTA with normal respiratory effort  CV: RRR, no m/r/g  Abdomen: soft, mild TTP LUQ/LLQ, normal bowel sounds  Extremities: no cyanosis, no peripheral edema  Neuro: No focal deficits, motor/sensory intact  Skin: Normal color, intact      Intake and Output:  Current Shift:  No intake/output data recorded.   Last three shifts:  10/07 1901 - 10/09 0700  In: 3170 [P.O.:480; I.V.:1050]  Out: 200 [Urine:200]    Lab/Data Reviewed:  BMP:   Lab Results   Component Value Date/Time     (L) 10/09/2020 04:40 AM    K 3.2 (L) 10/09/2020 04:40 AM    CL 99 (L) 10/09/2020 04:40 AM    CO2 28 10/09/2020 04:40 AM    AGAP 7 10/09/2020 04:40 AM    GLU 92 10/09/2020 04:40 AM    BUN 7 10/09/2020 04:40 AM    CREA 0.68 10/09/2020 04:40 AM    GFRAA >60 10/09/2020 04:40 AM    GFRNA >60 10/09/2020 04:40 AM     CBC:   Lab Results   Component Value Date/Time    WBC 6.2 10/09/2020 04:40 AM    HGB 6.7 (L) 10/09/2020 04:40 AM    HCT 19.6 (L) 10/09/2020 04:40 AM    PLT 51 (L) 10/09/2020 04:40 AM       Imaging Reviewed:  No results found.     Medications Reviewed:  Current Facility-Administered Medications   Medication Dose Route Frequency    0.9% sodium chloride infusion 250 mL  250 mL IntraVENous PRN    glucose chewable tablet 16 g  4 Tab Oral PRN    glucagon (GLUCAGEN) injection 1 mg  1 mg IntraMUSCular PRN    dextrose (D50) infusion 12.5-25 g  25-50 mL IntraVENous PRN    dextrose 5% - 0.9% NaCl with KCl 20 mEq/L infusion  100 mL/hr IntraVENous CONTINUOUS    citalopram (CELEXA) tablet 20 mg  20 mg Oral DAILY    sodium chloride (NS) flush 5-40 mL  5-40 mL IntraVENous Q8H    sodium chloride (NS) flush 5-40 mL  5-40 mL IntraVENous PRN    ondansetron (ZOFRAN) injection 4 mg  4 mg IntraVENous Q6H PRN    docusate sodium (COLACE) capsule 100 mg  100 mg Oral BID PRN    melatonin tablet 12 mg  12 mg Oral QHS PRN    albuterol-ipratropium (DUO-NEB) 2.5 MG-0.5 MG/3 ML  3 mL Nebulization Q4H PRN    pantoprazole (PROTONIX) injection 40 mg  40 mg IntraVENous DAILY PRN    morphine injection 2-4 mg  2-4 mg IntraVENous Q3H PRN    naloxone (NARCAN) injection 0.4 mg  0.4 mg IntraVENous PRN    prochlorperazine (COMPAZINE) injection 5 mg  5 mg IntraVENous Q4H PRN    LORazepam (ATIVAN) tablet 1 mg  1 mg Oral Q1H PRN    Or    LORazepam (ATIVAN) injection 1 mg  1 mg IntraVENous Q1H PRN    LORazepam (ATIVAN) tablet 2 mg  2 mg Oral Q1H PRN    Or    LORazepam (ATIVAN) injection 2 mg  2 mg IntraVENous Q1H PRN    LORazepam (ATIVAN) injection 3 mg  3 mg IntraVENous Q15MIN PRN         [place]YN [place] [id number]  Office:  [phone]  Pager: [phone]

## 2020-10-09 NOTE — ROUTINE PROCESS
Bedside and Verbal shift change report given to Jerry Reyes (oncoming nurse) by Ravi Mayfield RN (offgoing nurse). Report included the following information SBAR, Kardex, Intake/Output, MAR and Recent Results.

## 2020-10-09 NOTE — PROGRESS NOTES
Problem: Falls - Risk of  Goal: *Absence of Falls  Description: Document Siomara Horn Fall Risk and appropriate interventions in the flowsheet.   Outcome: Progressing Towards Goal  Note: Fall Risk Interventions:            Medication Interventions: Patient to call before getting OOB, Teach patient to arise slowly         History of Falls Interventions: Consult care management for discharge planning         Problem: Patient Education: Go to Patient Education Activity  Goal: Patient/Family Education  Outcome: Progressing Towards Goal     Problem: Discharge Planning  Goal: *Discharge to safe environment  Outcome: Progressing Towards Goal  Goal: *Knowledge of medication management  Outcome: Progressing Towards Goal  Goal: *Knowledge of discharge instructions  Outcome: Progressing Towards Goal     Problem: Nutrition Deficit  Goal: *Optimize nutritional status  Outcome: Progressing Towards Goal     Problem: Patient Education: Go to Patient Education Activity  Goal: Patient/Family Education  Outcome: Progressing Towards Goal     Problem: Pain  Goal: *Control of Pain  Outcome: Progressing Towards Goal

## 2020-10-10 VITALS
DIASTOLIC BLOOD PRESSURE: 64 MMHG | WEIGHT: 165 LBS | HEART RATE: 92 BPM | TEMPERATURE: 97.6 F | SYSTOLIC BLOOD PRESSURE: 106 MMHG | RESPIRATION RATE: 18 BRPM | OXYGEN SATURATION: 92 % | BODY MASS INDEX: 25.9 KG/M2 | HEIGHT: 67 IN

## 2020-10-10 LAB
ABO + RH BLD: NORMAL
BLD PROD TYP BPU: NORMAL
BLOOD GROUP ANTIBODIES SERPL: NORMAL
BPU ID: NORMAL
CALLED TO:,BCALL1: NORMAL
CROSSMATCH RESULT,%XM: NORMAL
SPECIMEN EXP DATE BLD: NORMAL
STATUS OF UNIT,%ST: NORMAL
UNIT DIVISION, %UDIV: 0

## 2020-10-10 NOTE — PROGRESS NOTES
Care Management Interventions  PCP Verified by CM: Yes(ZOEY Chou, was seen last month)  Palliative Care Criteria Met (RRAT>21 & CHF Dx)?: No  Mode of Transport at Discharge:  Other (see comment)(family)  Transition of Care Consult (CM Consult): Discharge Planning  Discharge Durable Medical Equipment: No  Physical Therapy Consult: No  Occupational Therapy Consult: No  Speech Therapy Consult: No  Current Support Network: Lives with Spouse  Confirm Follow Up Transport: Self  Discharge Location  Discharge Placement: (Left AMA)

## 2020-10-10 NOTE — ROUTINE PROCESS
Patient sitting at nurses station talking to Dr. Holley Bonilla regarding the fact that she is wanting to leave to go home at this time.

## 2020-10-10 NOTE — ROUTINE PROCESS
Patient called nurses station stating that there was a spider playing with her toes. Inspected room. No noted spider. Noted that patient had morphine 2 mg at 2112. Patient scored 0 on CIWA at beginning of shift. Patient sitting in bed watching television at this time. No other complaints.  
 
Roderick Hagan RN, BSN

## 2020-10-10 NOTE — PROGRESS NOTES
Problem: Falls - Risk of  Goal: *Absence of Falls  Description: Document Krisahn Tyler Fall Risk and appropriate interventions in the flowsheet.   Outcome: Resolved/Met  Note: Fall Risk Interventions:            Medication Interventions: Teach patient to arise slowly, Evaluate medications/consider consulting pharmacy         History of Falls Interventions: Door open when patient unattended         Problem: Patient Education: Go to Patient Education Activity  Goal: Patient/Family Education  Outcome: Resolved/Met     Problem: Discharge Planning  Goal: *Discharge to safe environment  Outcome: Resolved/Met  Goal: *Knowledge of medication management  Outcome: Resolved/Met  Goal: *Knowledge of discharge instructions  Outcome: Resolved/Met     Problem: Nutrition Deficit  Goal: *Optimize nutritional status  Outcome: Resolved/Met     Problem: Patient Education: Go to Patient Education Activity  Goal: Patient/Family Education  Outcome: Resolved/Met     Problem: Pain  Goal: *Control of Pain  Outcome: Resolved/Met

## 2020-10-10 NOTE — PROGRESS NOTES
INTERIM UPDATE - 3676 EST on 10/09/2020    Nursing Staff reports that Patient has been experiencing formication or otherwise tactile hallucinations of insects, spiders, or other creatures crawling over her legs. Possible Adverse Drug Reaction (Morphine)    Plan:  Discontinue IV Morphine. Start Oxycodone-APAP 5 mg-325 mg PO q4hrs PRN Pain. Continue to monitor. INTERIM UPDATE - 3502 EST on 10/10/2020    Patient expresses that she wants to go home and get \"a good night's sleep\" and states that she has not been able to sleep. Nursing Staff disagrees and states that Patient has been sleeping frequently. Additionally, Patient minimized receiving Morphine, saying she only received it twice, but records show she received it 3 times today and 11 times during the total stay (Morphine ~28 mg total). Patient answer 4 of 4 questions of Orientation correctly. Patient was administered a Test of Capacity. Patient was informed of three issues that were of greatest hazard to Patient (Alcoholic Hepatitis, Anemia requiring Blood Transfusion, and Hypoglycemia) in addition to Inflamed Gallbladder and improving Acute Pancreatitis. Patient was informed of services, diagnostics, and treatments currently being administered for these first three reasons: Gastroenterologist advise whether or not to receive steroids to reduce mortality in the instance of Alcoholic Hepatitis (complicated by Inflammation of Gallbladder posing a possible, relative contraindication), daily Blood Work to evaluate for Anemia along with Vitals and Nursing Staff observation to determine if additional Blood Transfusions were necessary (and also diagnostics to evaluate for cause of Anemia), and POC Glucose checks and Nursing Staff observation to determine Hypoglycemia for administration of IV/PO Dextrose.   Lastly, Patient was informed that by not receiving these diagnostics and treatment modalities, she could: (1) have increased mortality from Alcoholic Hepatitis untreated with steroids (which could be indicated), (2) symptomatic anemia to include weakness, shortness of breath, nausea, dizziness, and fatigue that could---if severe enough---result in fall, loss of consciousness, and global hypotension/hypoperfusion that could cause diffuse tissue injury, (3) hypoglycemia that could cause tremors, anxiety, and also result in seizure, loss of consciousness, and global tissue injury if severe enough. Severe injuries could result in death or loss of life style due to neurological/brain injuries (presumably from hypoxia or hypoperfusion). Patient was able to name all three problems, all modalities of treatment and surveillance, and also named all possibly consequences of failure to have these issues monitored and treated appropriately. Patient demonstrated forwards and backwards logic manipulation and deduction and also presented appropriate concerns using some medical knowledge about additional symptoms regarding peripheral diagnoses. Plan:  Patient demonstrates Capacity and reports that she has already called an [employer] to taxi her home. Patient signed paperwork to leave Against Medical Advice (AMA), which was witnessed by Patient's Nurse and Nursing Supervisor. Patient was escorted from [facility] Unit by Security Staff.

## 2020-10-10 NOTE — ROUTINE PROCESS
Noted patient changing her clothes and requested that this nurse discontinue her IVFs so that she can go home. Patient stated that her daughter was under the bed telling her to come home and she had already called her  to come get her. Attempted to administer narcan and patient refused to allow this nurse to touch her PIV. Went back to the nurse's station to contact nursing supervisor when patient attempted to leave off the unit by the elevator at the nurse's station. MD, security and nursing supervisor notified. MD on the unit speaking to the patient regarding her condition and the consequences of leaving AMA. Security on the unit shortly afterwards. Nursing supervisor on the unit as well. MD asked patient to allow the phlebotomist to draw her morning labs. Patient refused. Patient signed AMA form with physician. Witnessed by this nurse and nursing supervisor. Patient escorted from the building by security officers. Patient called an uber to come get her in front of the physician.  
 
Aron Barroso RN, BSN

## 2020-10-10 NOTE — ROUTINE PROCESS
Patient states that there is a centiped in the corner of her room screaming at her to leave. Noted that patient in the next room is screaming out during care. Informed patient that there was not an insect in the corner of her room. Noted patient had turned off her IV pump. IVFs resumed. Asked patient not to turn off fluids. Patient stated that there was some insects in her room and she needed to the fluids turned off. MD notified. Requested that physician change patient's pain medications. Noted hallucinations happen after morphine administration. MD changed morphine to percocet.  
 
Pk Nunn, RN, BSN

## 2020-10-10 NOTE — DISCHARGE SUMMARY
2 [facility]  Hospitalist Division    Discharge Summary    Patient: Amelia Silva MRN: [id number]  CSN: [id number]    YOB: 1985  Age: 38 y.o.   Sex: female    DOA: 10/6/2020 LOS:  LOS: 4 days   Discharge Date: 10/10/2020     Admission Diagnoses: Acute pancreatitis [K85.90]  Thrombocytopenia ([address] [D69.6]  Hypokalemia [E87.6]    Discharge Diagnoses:  Principal Problem:    Alcohol-induced acute pancreatitis (10/6/2020)    Active Problems:    Alcoholic cirrhosis ([address] (10/7/2020)      Thrombocytopenia ([address] (10/6/2020)      Hypokalemia (10/6/2020)      ETOH abuse (10/8/2020)      Anemia (10/9/2020)        Discharge Condition: Patient left AMA overnight    Consults: Gastroenterology    HPI: Matt Oglesby a 38 y.o. female who presents to [facility] ER with complaint of Abdominal Pain and Vomiting.  Patient states that this is the first time that she has had jaundice and the third time that she has had Acute Pancreatitis (previously diagnosed as Alcohol-induced Pancreatitis).  Patient reports that she has been experiencing abdominal pain, distention, and bilious vomit for the last two days.  Patient reports that her last drink was on Monday and consisted of Sprite & Vodka with ~4-5 shots of [place] states that she normally consumes 0.75-1.5 L of Liquor/week and has had Alcohol Withdrawal that consistent of shakes, sweats, and vomiting (but denies hallucinations, seizures, or agitation).  Notably, CT Abdomen/Pelvis showed evidence of Ascites, Cirrhosis, and Portal Hypertension on 8/11/2020.     In [facility], Patient is noted to have Heart Rate 102 bpm, Hgb 8.7, Platelet 27H, INR 2.0, Ammonia <10, Na+ 134, K+ 2.8, Cl- 97, Total Bilirubin 14.9, Total Protein 10.4, Albumin 1.8, , Alk Phos 332, Lipase 895, and HCG (-).  CT Abdomen/Pelvis showed Pancreatitis with possible infectious/inflammatory fluids collections around head and tail of pancreas.  CT also showed possibly secondary inflammation of Gallbladder.  Gastroenterological services were contacted by [facility] Physician to evaluate for the possible need for Interventional Radiological services needing to drain these fluids collections and it was deemed unlikely. Hospital Course: She was started on IV fluids, CIWA protocol. GI was consulted. \"She was recently admitted to [facility] a month ago for acute pancreatitis and was treated supportively. Neo Reynaga was there that the diagnosis of cirrhosis was made based on CT morphology, jaundice, abnormal LFTs and thrombocytopenia.  Her viral hepatitis serology was negative. \" Lipase improved. LFTs trended down slightly with the exception of Tbili. She required 1 U PRBCs on 10/9. No overt GI bleeding. Patient left AMA on 10/10 around 0500. Physical Exam:  Patient left AMA prior to being seen today    Significant Diagnostic Studies: All lab results for the last 24 hours reviewed. Ct Abd Pelv W Cont    Result Date: 10/7/2020  EXAM: CT ABD PELV W CONT CLINICAL INDICATION/HISTORY: Abdominal pain and vomiting COMPARISON: Right upper quadrant ultrasound same day TECHNIQUE:   CT of the abdomen and pelvis following intravenous contrast administration. Coronal and sagittal reformats were generated and reviewed. One or more dose reduction techniques were used on this CT: automated exposure control, adjustment of the mAs and/or kVp according to patient size, and iterative reconstruction techniques. The specific techniques used on this CT exam have been documented in the patient's electronic medical record. Digital Imaging and Communications in Medicine (DICOM) format image data are available to nonaffiliated external healthcare facilities or entities on a secure, media free, reciprocally searchable basis with patient authorization for at least a 12-month period after this study. _______________ FINDINGS: LOWER THORAX:  No acute pulmonary infiltrate. No pleural effusion.   No global cardiomegaly or pericardial effusion. LIVER AND BILIARY:  Hepatomegaly and hepatic steatosis. Simple appearing cyst are seen within the liver. No suspicious liver lesions. No biliary dilation. Gallbladder is mildly distended with heterogeneous gallbladder wall thickening and pericholecystic fluid. SPLEEN:  Normal. PANCREAS:  Mild inflammatory stranding is seen adjacent to the pancreatic head and separately the pancreatic tail. Borderline pancreatic duct dilatation throughout the pancreatic body. Focal hypodensity is seen along the anterior aspect of the pancreatic tail, measuring approximately 1.7 x 1.3 cm. Round hypodensity is also seen along the posterior aspect of the pancreatic head/body junction, measuring 1.5 x 1.6 cm. ADRENALS:  Normal. KIDNEYS:  Normal. LYMPH NODES:  No mesenteric or retroperitoneal lymphadenopathy. GI TRACT:  Mild heterogeneous mural thickening is seen throughout the cecum and proximal ascending colon. Normal caliber small and large bowel loops. No morphology of bowel obstruction. Normal appendix. PELVIC ORGANS:  Bladder is normal in appearance. No acute abnormality. VASCULATURE: Normal caliber lower thoracic and abdominal aorta with mild atherosclerotic vascular calcification. OTHER:   Minimal perihepatic and dependent pelvic ascites. No free intraperitoneal air. OSSEOUS STRUCTURES:  No acute osseous abnormality. Surgical changes and hardware of right hip arthroplasty. Left femoral head avascular necrosis with several subchondral cystic changes and developing secondary degenerative osteoarthritis. Transitional lumbosacral anatomy. _______________     IMPRESSION: 1. Findings a mild acute on chronic pancreatitis, to include pancreatic pseudocysts along the anterior aspect of the pancreatic tail and the posterior aspect of the pancreatic head/body junction. 2. Gallbladder distention and circumferential gallbladder wall thickening are suggestive of cholecystitis.  3. Mild, nonspecific heterogeneous mural thickening of the cecum and proximal ascending colon. Initial preliminary report was provided to the ED by the on-call radiology resident. [id number]    Result Date: 10/7/2020  EXAM: ULTRASOUND ABDOMEN LIMITED INDICATION: Right upper quadrant abdominal pain. COMPARISON: None. TECHNIQUE: Real-time abdomen/right upper quadrant sonography in multiple planes was performed with image documentation. Grayscale, color flow Doppler imaging, and velocity spectral waveform analysis of the portal vein was performed (duplex imaging). _______________ FINDINGS: LIVER: Liver is mildly enlarged in size, largest dimension of the right hepatic lobe measuring 21.9 cm. Increased hepatic echotexture. No focal mass. Normal direction of blood flow in the portal vein. Portal vein measures 0.9 cm. Color and spectral flow analysis of the portal vein shows normal (hepatopedal) direction of flow. BILIARY SYSTEM: No intrahepatic biliary dilatation. Common bile duct is normal in caliber measuring 4 mm. GALLBLADDER: Minimal pericholecystic edema. Borderline gallbladder wall thickening. Negative sonographic Robles's sign. RIGHT KIDNEY: Normal in size, measuring 11.0 cm in length. No hydronephrosis or renal mass. No visible calculi. PANCREAS: Head and body are unremarkable in appearance though the tail is obscured by overlying bowel gas. IVC: Visualized portions are unremarkable in appearance. AORTA: Normal caliber. No aneurysm or dissection. OTHER: Minimal perihepatic ascites. _______________     IMPRESSION: 1. Borderline gallbladder wall thickening and pericholecystic edema, concerning for acute cholecystitis. 2. Hepatomegaly and hepatic steatosis. Initial preliminary report was provided to the ED by the on-call radiology resident. Xr Chest Port    Result Date: 10/7/2020  EXAM: CHEST RADIOGRAPH, SINGLE VIEW CLINICAL INDICATION/HISTORY: Chest pain COMPARISON: None.  TECHNIQUE: Portable frontal view of the chest was obtained. _______________ FINDINGS: SUPPORT DEVICES: None. HEART AND MEDIASTINUM: Cardiomediastinal silhouette appears within normal limits. Normal caliber thoracic aorta. No central vascular congestion. LUNGS AND PLEURAL SPACES: Lungs are well aerated with no confluent airspace opacity. No pleural effusion or pneumothorax. BONY THORAX AND SOFT TISSUES: No acute osseous abnormality. _______________     IMPRESSION: No active cardiopulmonary disease. Mri Abd W Mrcp W Wo Cont    Result Date: 10/7/2020  EXAM: MRI ABDOMEN CLINICAL INDICATION/HISTORY:  Acute pancreatitis with cholecystitis and jaundice. Evaluate for CBD stone or stricture. -Additional: Abdominal pain and vomiting with several episodes of acute pancreatitis COMPARISON: CT abdomen and pelvis: 10/6/2020. TECHNIQUE: MRI of the abdomen was performed with and without intravenous contrast. MRCP included. Post processing 3D rendering was done on a separate workstation under concurrent physician supervision. _______________ FINDINGS: LOWER CHEST: Unremarkable. LIVER: Enlarged measuring 22.7 cm craniocaudal. Loss of signal on out of phase images consistent with diffuse steatosis. Scattered small subcentimeter T2 hyperintense, nonenhancing lesions likely represent hepatic cysts. No suspicious liver mass. BILIARY: There is a short segment of smooth stenosis of the intrapancreatic common bile duct, likely secondary to the adjacent inflammation and fluid collection. No filling defects are appreciated. The gallbladder is normal without intraluminal filling defects or wall thickening. There is a small amount of pericholecystic fluid. PANCREAS: Pancreatic parenchyma is homogeneously T2 hypointense, mildly T1 hyperintense and demonstrates homogeneous enhancement. There is diffuse pancreatic ductal dilation with beading/stricturing measuring up to 5 mm in the proximal pancreatic body with tapering distally.  Conventional pancreatic ductal anatomy; no evidence of pancreatic divisum. There is mild peripancreatic inflammation. Several nonenhancing pancreatic fluid collections are visualized, likely pseudocysts, for reference:   > The pancreatic head collection is mildly T1 and T2 hyperintense and measures 1.2 x 1.2 cm. There is mild compression of the intrapancreatic common bile duct adjacent to this collection. There is no associated enhancement or restricted diffusion. >  The pancreatic tail collection is very T1 and T2 hyperintense and measures 1.4 x 1.4 cm. There is no associated enhancement or restricted diffusion. SPLEEN: Borderline enlarged measuring 13.1 cm. ADRENALS: Normal. KIDNEYS: Normal. LYMPH NODES: No enlarged lymph nodes. GASTROINTESTINAL TRACT: No bowel dilation or wall thickening. VASCULATURE: Unremarkable. BONES: No acute or aggressive osseous abnormalities identified. OTHER: Small amount of ascites present primarily in the perihepatic space and right paracolic gutter. _______________     IMPRESSION: 1. Findings of acute on chronic pancreatitis with pseudocysts in the head and tail. Mass effect from the pancreatic head pseudocyst causes mild narrowing of the common bile duct. No evidence of acute cholecystitis. 2.  Small volume ascites present. 3.  Hepatomegaly and diffuse steatosis. Borderline splenomegaly.       Procedures: none    Discharge Medications:   Patient left AMA      Discharge time: >35 minutes    [facility]ATHRYN FishR[facility]  Office:  [phone]  Pager: [phone]      10/10/2020, 4:00 PM